# Patient Record
Sex: FEMALE | Race: WHITE | NOT HISPANIC OR LATINO | Employment: UNEMPLOYED | URBAN - METROPOLITAN AREA
[De-identification: names, ages, dates, MRNs, and addresses within clinical notes are randomized per-mention and may not be internally consistent; named-entity substitution may affect disease eponyms.]

---

## 2018-05-29 ENCOUNTER — TRANSCRIBE ORDERS (OUTPATIENT)
Dept: LAB | Facility: HOSPITAL | Age: 33
End: 2018-05-29

## 2018-05-29 ENCOUNTER — LAB (OUTPATIENT)
Dept: LAB | Facility: HOSPITAL | Age: 33
End: 2018-05-29

## 2018-05-29 DIAGNOSIS — R30.0 DYSURIA: ICD-10-CM

## 2018-05-29 DIAGNOSIS — R30.0 DYSURIA: Primary | ICD-10-CM

## 2018-05-29 LAB
BACTERIA UR QL AUTO: ABNORMAL /HPF
BILIRUB UR QL STRIP: NEGATIVE
CLARITY UR: ABNORMAL
COLOR UR: ABNORMAL
GLUCOSE UR STRIP-MCNC: NEGATIVE MG/DL
HGB UR QL STRIP.AUTO: NEGATIVE
HYALINE CASTS UR QL AUTO: ABNORMAL /LPF
KETONES UR QL STRIP: ABNORMAL
LEUKOCYTE ESTERASE UR QL STRIP.AUTO: ABNORMAL
NITRITE UR QL STRIP: NEGATIVE
PH UR STRIP.AUTO: 6 [PH] (ref 5–8)
PROT UR QL STRIP: ABNORMAL
RBC # UR: ABNORMAL /HPF
REF LAB TEST METHOD: ABNORMAL
SP GR UR STRIP: 1.03 (ref 1–1.03)
SQUAMOUS #/AREA URNS HPF: ABNORMAL /HPF
UROBILINOGEN UR QL STRIP: ABNORMAL
WBC UR QL AUTO: ABNORMAL /HPF

## 2018-05-29 PROCEDURE — 87186 SC STD MICRODIL/AGAR DIL: CPT

## 2018-05-29 PROCEDURE — 81001 URINALYSIS AUTO W/SCOPE: CPT

## 2018-05-29 PROCEDURE — 87086 URINE CULTURE/COLONY COUNT: CPT

## 2018-05-29 PROCEDURE — 87077 CULTURE AEROBIC IDENTIFY: CPT

## 2018-05-31 LAB — BACTERIA SPEC AEROBE CULT: ABNORMAL

## 2018-06-07 ENCOUNTER — TRANSCRIBE ORDERS (OUTPATIENT)
Dept: LAB | Facility: HOSPITAL | Age: 33
End: 2018-06-07

## 2018-06-07 ENCOUNTER — LAB (OUTPATIENT)
Dept: LAB | Facility: HOSPITAL | Age: 33
End: 2018-06-07

## 2018-06-07 DIAGNOSIS — Z34.81 PRENATAL CARE, SUBSEQUENT PREGNANCY, FIRST TRIMESTER: ICD-10-CM

## 2018-06-07 DIAGNOSIS — Z3A.08 8 WEEKS GESTATION OF PREGNANCY: ICD-10-CM

## 2018-06-07 DIAGNOSIS — Z3A.08 8 WEEKS GESTATION OF PREGNANCY: Primary | ICD-10-CM

## 2018-06-07 LAB
ABO GROUP BLD: NORMAL
AMPHET+METHAMPHET UR QL: NEGATIVE
AMPHETAMINES UR QL: NEGATIVE
BACTERIA UR QL AUTO: ABNORMAL /HPF
BARBITURATES UR QL SCN: NEGATIVE
BASOPHILS # BLD AUTO: 0.02 10*3/MM3 (ref 0–0.2)
BASOPHILS NFR BLD AUTO: 0.2 % (ref 0–1)
BENZODIAZ UR QL SCN: NEGATIVE
BILIRUB UR QL STRIP: NEGATIVE
BLD GP AB SCN SERPL QL: NEGATIVE
BUPRENORPHINE SERPL-MCNC: NEGATIVE NG/ML
CANNABINOIDS SERPL QL: NEGATIVE
CLARITY UR: ABNORMAL
COCAINE UR QL: NEGATIVE
COLOR UR: YELLOW
DEPRECATED RDW RBC AUTO: 42.1 FL (ref 37–54)
EOSINOPHIL # BLD AUTO: 0.09 10*3/MM3 (ref 0–0.3)
EOSINOPHIL NFR BLD AUTO: 1.1 % (ref 0–3)
ERYTHROCYTE [DISTWIDTH] IN BLOOD BY AUTOMATED COUNT: 12.4 % (ref 11.3–14.5)
GLUCOSE BLD-MCNC: 90 MG/DL (ref 70–100)
GLUCOSE UR STRIP-MCNC: NEGATIVE MG/DL
HBV SURFACE AG SERPL QL IA: NORMAL
HCT VFR BLD AUTO: 35.9 % (ref 34.5–44)
HCV AB SER DONR QL: NORMAL
HGB BLD-MCNC: 11.9 G/DL (ref 11.5–15.5)
HGB UR QL STRIP.AUTO: NEGATIVE
HIV1+2 AB SER QL: NORMAL
HYALINE CASTS UR QL AUTO: ABNORMAL /LPF
IMM GRANULOCYTES # BLD: 0.02 10*3/MM3 (ref 0–0.03)
IMM GRANULOCYTES NFR BLD: 0.2 % (ref 0–0.6)
KETONES UR QL STRIP: NEGATIVE
LEUKOCYTE ESTERASE UR QL STRIP.AUTO: ABNORMAL
LYMPHOCYTES # BLD AUTO: 2.37 10*3/MM3 (ref 0.6–4.8)
LYMPHOCYTES NFR BLD AUTO: 28.3 % (ref 24–44)
MCH RBC QN AUTO: 30.7 PG (ref 27–31)
MCHC RBC AUTO-ENTMCNC: 33.1 G/DL (ref 32–36)
MCV RBC AUTO: 92.8 FL (ref 80–99)
METHADONE UR QL SCN: NEGATIVE
MONOCYTES # BLD AUTO: 0.78 10*3/MM3 (ref 0–1)
MONOCYTES NFR BLD AUTO: 9.3 % (ref 0–12)
NEUTROPHILS # BLD AUTO: 5.12 10*3/MM3 (ref 1.5–8.3)
NEUTROPHILS NFR BLD AUTO: 61.1 % (ref 41–71)
NITRITE UR QL STRIP: NEGATIVE
OPIATES UR QL: NEGATIVE
OXYCODONE UR QL SCN: NEGATIVE
PCP UR QL SCN: NEGATIVE
PH UR STRIP.AUTO: 7.5 [PH] (ref 5–8)
PLATELET # BLD AUTO: 268 10*3/MM3 (ref 150–450)
PMV BLD AUTO: 10.1 FL (ref 6–12)
PROPOXYPH UR QL: NEGATIVE
PROT UR QL STRIP: NEGATIVE
RBC # BLD AUTO: 3.87 10*6/MM3 (ref 3.89–5.14)
RBC # UR: ABNORMAL /HPF
REF LAB TEST METHOD: ABNORMAL
RH BLD: POSITIVE
RUBV IGG SER QL: NORMAL
RUBV IGG SER-ACNC: 16 IU/ML
SP GR UR STRIP: 1.01 (ref 1–1.03)
SQUAMOUS #/AREA URNS HPF: ABNORMAL /HPF
T4 FREE SERPL-MCNC: 1.22 NG/DL (ref 0.89–1.76)
TRICYCLICS UR QL SCN: NEGATIVE
TSH SERPL DL<=0.05 MIU/L-ACNC: 0.11 MIU/ML (ref 0.35–5.35)
UROBILINOGEN UR QL STRIP: ABNORMAL
WBC NRBC COR # BLD: 8.38 10*3/MM3 (ref 3.5–10.8)
WBC UR QL AUTO: ABNORMAL /HPF

## 2018-06-07 PROCEDURE — 84443 ASSAY THYROID STIM HORMONE: CPT

## 2018-06-07 PROCEDURE — 87186 SC STD MICRODIL/AGAR DIL: CPT

## 2018-06-07 PROCEDURE — 36415 COLL VENOUS BLD VENIPUNCTURE: CPT

## 2018-06-07 PROCEDURE — 81001 URINALYSIS AUTO W/SCOPE: CPT

## 2018-06-07 PROCEDURE — 86803 HEPATITIS C AB TEST: CPT

## 2018-06-07 PROCEDURE — 87077 CULTURE AEROBIC IDENTIFY: CPT

## 2018-06-07 PROCEDURE — 84439 ASSAY OF FREE THYROXINE: CPT

## 2018-06-07 PROCEDURE — 87086 URINE CULTURE/COLONY COUNT: CPT

## 2018-06-07 PROCEDURE — 82947 ASSAY GLUCOSE BLOOD QUANT: CPT

## 2018-06-07 PROCEDURE — 80306 DRUG TEST PRSMV INSTRMNT: CPT

## 2018-06-07 PROCEDURE — 80081 OBSTETRIC PANEL INC HIV TSTG: CPT

## 2018-06-08 LAB — RPR SER QL: NORMAL

## 2018-06-09 LAB — BACTERIA SPEC AEROBE CULT: ABNORMAL

## 2018-09-06 ENCOUNTER — TRANSCRIBE ORDERS (OUTPATIENT)
Dept: LAB | Facility: HOSPITAL | Age: 33
End: 2018-09-06

## 2018-09-06 ENCOUNTER — LAB (OUTPATIENT)
Dept: LAB | Facility: HOSPITAL | Age: 33
End: 2018-09-06

## 2018-09-06 DIAGNOSIS — Z34.82 PRENATAL CARE, SUBSEQUENT PREGNANCY, SECOND TRIMESTER: ICD-10-CM

## 2018-09-06 DIAGNOSIS — Z34.82 PRENATAL CARE, SUBSEQUENT PREGNANCY, SECOND TRIMESTER: Primary | ICD-10-CM

## 2018-09-06 LAB — TSH SERPL DL<=0.05 MIU/L-ACNC: 0.58 MIU/ML (ref 0.35–5.35)

## 2018-09-06 PROCEDURE — 36415 COLL VENOUS BLD VENIPUNCTURE: CPT

## 2018-09-06 PROCEDURE — 84443 ASSAY THYROID STIM HORMONE: CPT

## 2018-09-07 ENCOUNTER — TRANSCRIBE ORDERS (OUTPATIENT)
Dept: WOMENS IMAGING | Facility: HOSPITAL | Age: 33
End: 2018-09-07

## 2018-09-07 DIAGNOSIS — M21.922 ACQUIRED ARM DEFORMITY, LEFT: ICD-10-CM

## 2018-09-07 DIAGNOSIS — O28.3 ABNORMAL FETAL ULTRASOUND: Primary | ICD-10-CM

## 2018-09-07 DIAGNOSIS — M21.942 HAND DEFORMITY, LEFT: ICD-10-CM

## 2018-09-17 ENCOUNTER — HOSPITAL ENCOUNTER (OUTPATIENT)
Dept: WOMENS IMAGING | Facility: HOSPITAL | Age: 33
Discharge: HOME | End: 2018-09-17
Attending: NURSE PRACTITIONER | Admitting: NURSE PRACTITIONER

## 2018-09-17 ENCOUNTER — OFFICE VISIT (OUTPATIENT)
Dept: OBSTETRICS AND GYNECOLOGY | Facility: HOSPITAL | Age: 33
End: 2018-09-17

## 2018-09-17 VITALS
DIASTOLIC BLOOD PRESSURE: 68 MMHG | HEIGHT: 63 IN | BODY MASS INDEX: 23.04 KG/M2 | SYSTOLIC BLOOD PRESSURE: 112 MMHG | WEIGHT: 130 LBS

## 2018-09-17 DIAGNOSIS — M21.942 HAND DEFORMITY, LEFT: ICD-10-CM

## 2018-09-17 DIAGNOSIS — O34.219 PREVIOUS CESAREAN DELIVERY AFFECTING PREGNANCY: ICD-10-CM

## 2018-09-17 DIAGNOSIS — M21.922 ACQUIRED ARM DEFORMITY, LEFT: ICD-10-CM

## 2018-09-17 DIAGNOSIS — Z84.89 FAMILY HISTORY OF GENETIC DISEASE: ICD-10-CM

## 2018-09-17 DIAGNOSIS — O28.3 ABNORMAL FETAL ULTRASOUND: ICD-10-CM

## 2018-09-17 DIAGNOSIS — IMO0002 FETAL ANOMALY: Primary | ICD-10-CM

## 2018-09-17 PROCEDURE — 99242 OFF/OP CONSLTJ NEW/EST SF 20: CPT | Performed by: OBSTETRICS & GYNECOLOGY

## 2018-09-17 PROCEDURE — 76811 OB US DETAILED SNGL FETUS: CPT

## 2018-09-17 PROCEDURE — 76811 OB US DETAILED SNGL FETUS: CPT | Performed by: OBSTETRICS & GYNECOLOGY

## 2018-09-17 RX ORDER — PRENATAL WITH FERROUS FUM AND FOLIC ACID 3080; 920; 120; 400; 22; 1.84; 3; 20; 10; 1; 12; 200; 27; 25; 2 [IU]/1; [IU]/1; MG/1; [IU]/1; MG/1; MG/1; MG/1; MG/1; MG/1; MG/1; UG/1; MG/1; MG/1; MG/1; MG/1
1 TABLET ORAL DAILY
COMMUNITY
End: 2019-07-07

## 2018-09-17 RX ORDER — ACYCLOVIR 200 MG/1
200 CAPSULE ORAL DAILY
COMMUNITY
End: 2019-01-12 | Stop reason: HOSPADM

## 2018-09-17 NOTE — PROGRESS NOTES
Denies problems. Reports had a genetic screening test drawn 9/6/2018 but has not received results yet. Previous child with MCAD; is seen in [facility] at [facility]. Reports has been told there is a 50% chance for each pregnancy with same FOB to be affected by MCAD. Next OB visit in one week.

## 2018-10-15 ENCOUNTER — OFFICE VISIT (OUTPATIENT)
Dept: OBSTETRICS AND GYNECOLOGY | Facility: HOSPITAL | Age: 33
End: 2018-10-15

## 2018-10-15 ENCOUNTER — HOSPITAL ENCOUNTER (OUTPATIENT)
Dept: WOMENS IMAGING | Facility: HOSPITAL | Age: 33
Discharge: HOME | End: 2018-10-15
Attending: OBSTETRICS & GYNECOLOGY | Admitting: OBSTETRICS & GYNECOLOGY

## 2018-10-15 VITALS — WEIGHT: 134.4 LBS | DIASTOLIC BLOOD PRESSURE: 59 MMHG | BODY MASS INDEX: 23.81 KG/M2 | SYSTOLIC BLOOD PRESSURE: 106 MMHG

## 2018-10-15 DIAGNOSIS — Z84.89 FAMILY HISTORY OF GENETIC DISEASE: ICD-10-CM

## 2018-10-15 DIAGNOSIS — O34.219 PREVIOUS CESAREAN DELIVERY AFFECTING PREGNANCY: ICD-10-CM

## 2018-10-15 DIAGNOSIS — Z84.89 FAMILY HISTORY OF GENETIC DISEASE: Primary | ICD-10-CM

## 2018-10-15 DIAGNOSIS — IMO0002 FETAL ANOMALY: ICD-10-CM

## 2018-10-15 PROCEDURE — 76816 OB US FOLLOW-UP PER FETUS: CPT | Performed by: OBSTETRICS & GYNECOLOGY

## 2018-10-15 PROCEDURE — 76816 OB US FOLLOW-UP PER FETUS: CPT

## 2018-10-15 NOTE — PROGRESS NOTES
Documentation of the ultasound findings, images, and interpretations with be available in the patient's Viewpoint report located in the Chart Review Imaging tab in [facility].

## 2018-10-25 ENCOUNTER — LAB REQUISITION (OUTPATIENT)
Dept: LAB | Facility: HOSPITAL | Age: 33
End: 2018-10-25

## 2018-10-25 DIAGNOSIS — Z34.83 ENCOUNTER FOR SUPERVISION OF OTHER NORMAL PREGNANCY, THIRD TRIMESTER: ICD-10-CM

## 2018-10-25 DIAGNOSIS — Z00.00 ROUTINE GENERAL MEDICAL EXAMINATION AT A HEALTH CARE FACILITY: ICD-10-CM

## 2018-10-25 DIAGNOSIS — Z3A.28 28 WEEKS GESTATION OF PREGNANCY: ICD-10-CM

## 2018-10-25 LAB
BLD GP AB SCN SERPL QL: NEGATIVE
DEPRECATED RDW RBC AUTO: 44.6 FL (ref 37–54)
ERYTHROCYTE [DISTWIDTH] IN BLOOD BY AUTOMATED COUNT: 12.8 % (ref 11.3–14.5)
GLUCOSE 1H P 100 G GLC PO SERPL-MCNC: 91 MG/DL (ref 65–140)
HCT VFR BLD AUTO: 32.1 % (ref 34.5–44)
HGB BLD-MCNC: 10.6 G/DL (ref 11.5–15.5)
MCH RBC QN AUTO: 32 PG (ref 27–31)
MCHC RBC AUTO-ENTMCNC: 33 G/DL (ref 32–36)
MCV RBC AUTO: 97 FL (ref 80–99)
PLATELET # BLD AUTO: 253 10*3/MM3 (ref 150–450)
PMV BLD AUTO: 10.2 FL (ref 6–12)
RBC # BLD AUTO: 3.31 10*6/MM3 (ref 3.89–5.14)
WBC NRBC COR # BLD: 11.22 10*3/MM3 (ref 3.5–10.8)

## 2018-10-25 PROCEDURE — 82950 GLUCOSE TEST: CPT | Performed by: NURSE PRACTITIONER

## 2018-10-25 PROCEDURE — 85027 COMPLETE CBC AUTOMATED: CPT | Performed by: NURSE PRACTITIONER

## 2018-10-25 PROCEDURE — 36415 COLL VENOUS BLD VENIPUNCTURE: CPT | Performed by: NURSE PRACTITIONER

## 2018-10-25 PROCEDURE — 86850 RBC ANTIBODY SCREEN: CPT | Performed by: NURSE PRACTITIONER

## 2018-12-10 ENCOUNTER — OFFICE VISIT (OUTPATIENT)
Dept: OBSTETRICS AND GYNECOLOGY | Facility: HOSPITAL | Age: 33
End: 2018-12-10

## 2018-12-10 ENCOUNTER — HOSPITAL ENCOUNTER (OUTPATIENT)
Dept: WOMENS IMAGING | Facility: HOSPITAL | Age: 33
Discharge: HOME | End: 2018-12-10
Admitting: OBSTETRICS & GYNECOLOGY

## 2018-12-10 VITALS — SYSTOLIC BLOOD PRESSURE: 120 MMHG | DIASTOLIC BLOOD PRESSURE: 70 MMHG | BODY MASS INDEX: 23.74 KG/M2 | WEIGHT: 134 LBS

## 2018-12-10 DIAGNOSIS — O34.219 PREVIOUS CESAREAN DELIVERY AFFECTING PREGNANCY: ICD-10-CM

## 2018-12-10 DIAGNOSIS — Z84.89 FAMILY HISTORY OF GENETIC DISEASE: Primary | ICD-10-CM

## 2018-12-10 DIAGNOSIS — Z84.89 FAMILY HISTORY OF GENETIC DISEASE: ICD-10-CM

## 2018-12-10 PROCEDURE — 76816 OB US FOLLOW-UP PER FETUS: CPT

## 2018-12-10 PROCEDURE — 76816 OB US FOLLOW-UP PER FETUS: CPT | Performed by: OBSTETRICS & GYNECOLOGY

## 2018-12-10 NOTE — PROGRESS NOTES
Documentation of the ultasound findings, images, and interpretations with be available in the patient's Viewpoint report located in the Chart Review Imaging tab in [facility].

## 2018-12-10 NOTE — PROGRESS NOTES
Pt states she has felt increased pelvic pressure the past couple of days.  Denies vaginal bleeding or spotting.  To see NAIF Parker Dec. 20.

## 2018-12-20 LAB — EXTERNAL GROUP B STREP ANTIGEN: NEGATIVE

## 2019-01-03 ENCOUNTER — PREP FOR SURGERY (OUTPATIENT)
Dept: OTHER | Facility: HOSPITAL | Age: 34
End: 2019-01-03

## 2019-01-03 DIAGNOSIS — Z34.90 TERM PREGNANCY: Primary | ICD-10-CM

## 2019-01-03 RX ORDER — MISOPROSTOL 200 UG/1
800 TABLET ORAL AS NEEDED
Status: CANCELLED | OUTPATIENT
Start: 2019-01-03

## 2019-01-03 RX ORDER — TRISODIUM CITRATE DIHYDRATE AND CITRIC ACID MONOHYDRATE 500; 334 MG/5ML; MG/5ML
30 SOLUTION ORAL ONCE
Status: CANCELLED | OUTPATIENT
Start: 2019-01-03 | End: 2019-01-03

## 2019-01-03 RX ORDER — CARBOPROST TROMETHAMINE 250 UG/ML
250 INJECTION, SOLUTION INTRAMUSCULAR AS NEEDED
Status: CANCELLED | OUTPATIENT
Start: 2019-01-03

## 2019-01-03 RX ORDER — CEFAZOLIN SODIUM 2 G/100ML
2 INJECTION, SOLUTION INTRAVENOUS ONCE
Status: CANCELLED | OUTPATIENT
Start: 2019-01-03 | End: 2019-01-03

## 2019-01-03 RX ORDER — OXYTOCIN-SODIUM CHLORIDE 0.9% IV SOLN 30 UNIT/500ML 30-0.9/5 UT/ML-%
85 SOLUTION INTRAVENOUS ONCE
Status: CANCELLED | OUTPATIENT
Start: 2019-01-03 | End: 2019-01-03

## 2019-01-03 RX ORDER — SODIUM CHLORIDE, SODIUM LACTATE, POTASSIUM CHLORIDE, CALCIUM CHLORIDE 600; 310; 30; 20 MG/100ML; MG/100ML; MG/100ML; MG/100ML
125 INJECTION, SOLUTION INTRAVENOUS CONTINUOUS
Status: CANCELLED | OUTPATIENT
Start: 2019-01-03

## 2019-01-03 RX ORDER — SODIUM CHLORIDE 0.9 % (FLUSH) 0.9 %
3 SYRINGE (ML) INJECTION EVERY 12 HOURS SCHEDULED
Status: CANCELLED | OUTPATIENT
Start: 2019-01-03

## 2019-01-03 RX ORDER — METHYLERGONOVINE MALEATE 0.2 MG/ML
200 INJECTION INTRAVENOUS ONCE AS NEEDED
Status: CANCELLED | OUTPATIENT
Start: 2019-01-03

## 2019-01-03 RX ORDER — HYDROCODONE BITARTRATE AND ACETAMINOPHEN 5; 325 MG/1; MG/1
1 TABLET ORAL EVERY 4 HOURS PRN
Status: CANCELLED | OUTPATIENT
Start: 2019-01-03 | End: 2019-01-13

## 2019-01-03 RX ORDER — SODIUM CHLORIDE 0.9 % (FLUSH) 0.9 %
3-10 SYRINGE (ML) INJECTION AS NEEDED
Status: CANCELLED | OUTPATIENT
Start: 2019-01-03

## 2019-01-03 RX ORDER — OXYTOCIN-SODIUM CHLORIDE 0.9% IV SOLN 30 UNIT/500ML 30-0.9/5 UT/ML-%
650 SOLUTION INTRAVENOUS ONCE
Status: CANCELLED | OUTPATIENT
Start: 2019-01-03 | End: 2019-01-03

## 2019-01-03 RX ORDER — LIDOCAINE HYDROCHLORIDE 10 MG/ML
5 INJECTION, SOLUTION EPIDURAL; INFILTRATION; INTRACAUDAL; PERINEURAL AS NEEDED
Status: CANCELLED | OUTPATIENT
Start: 2019-01-03

## 2019-01-03 RX ORDER — IBUPROFEN 600 MG/1
600 TABLET ORAL EVERY 6 HOURS PRN
Status: CANCELLED | OUTPATIENT
Start: 2019-01-03

## 2019-01-09 ENCOUNTER — APPOINTMENT (OUTPATIENT)
Dept: PREADMISSION TESTING | Facility: HOSPITAL | Age: 34
End: 2019-01-09

## 2019-01-09 VITALS — WEIGHT: 137.35 LBS | HEIGHT: 64 IN | BODY MASS INDEX: 23.45 KG/M2

## 2019-01-09 DIAGNOSIS — Z34.90 TERM PREGNANCY: ICD-10-CM

## 2019-01-09 LAB
ABO GROUP BLD: NORMAL
BLD GP AB SCN SERPL QL: NEGATIVE
DEPRECATED RDW RBC AUTO: 47.4 FL (ref 37–54)
ERYTHROCYTE [DISTWIDTH] IN BLOOD BY AUTOMATED COUNT: 13.5 % (ref 11.3–14.5)
HCT VFR BLD AUTO: 34.7 % (ref 34.5–44)
HGB BLD-MCNC: 11.6 G/DL (ref 11.5–15.5)
MCH RBC QN AUTO: 32.4 PG (ref 27–31)
MCHC RBC AUTO-ENTMCNC: 33.4 G/DL (ref 32–36)
MCV RBC AUTO: 96.9 FL (ref 80–99)
PLATELET # BLD AUTO: 256 10*3/MM3 (ref 150–450)
PMV BLD AUTO: 10.4 FL (ref 6–12)
RBC # BLD AUTO: 3.58 10*6/MM3 (ref 3.89–5.14)
RH BLD: POSITIVE
T&S EXPIRATION DATE: NORMAL
WBC NRBC COR # BLD: 11.29 10*3/MM3 (ref 3.5–10.8)

## 2019-01-09 PROCEDURE — 86900 BLOOD TYPING SEROLOGIC ABO: CPT | Performed by: OBSTETRICS & GYNECOLOGY

## 2019-01-09 PROCEDURE — 36415 COLL VENOUS BLD VENIPUNCTURE: CPT

## 2019-01-09 PROCEDURE — 86901 BLOOD TYPING SEROLOGIC RH(D): CPT | Performed by: OBSTETRICS & GYNECOLOGY

## 2019-01-09 PROCEDURE — 86850 RBC ANTIBODY SCREEN: CPT | Performed by: OBSTETRICS & GYNECOLOGY

## 2019-01-09 PROCEDURE — 85027 COMPLETE CBC AUTOMATED: CPT | Performed by: OBSTETRICS & GYNECOLOGY

## 2019-01-09 NOTE — DISCHARGE INSTRUCTIONS
What to know before your arrive:     -Do not eat, drink or chew gum after midnight the day before your procedure.    This also includes mints.   -Do not shave any part of your body including abdomen or pelvic are for two    days before your procedure.   -If you are taking a scheduled medication (insulin, blood pressure medicine,   antibiotics) please consult with your physician whether to take on the day of   surgery.   -Remove all jewelry including rings, wedding bands, and piercing before coming   to the hospital.   -Leave important valuables at home.   -Do not wear dark fingernail polish.   -Bring the following with you to the hospital:    -Picture ID and insurance, Medicare or Medicaid cards    -Co-pay/deductible required by insurance (Cash, Check, Credit Card)    -Copy of living will or power  document (if applicable)    -CPAP mask and tubing, not machine (if applicable)    -Skin prep instructions sheet    What to know the day of procedure:     -Park in the [facility], take elevator for first floor, exit to the right and  proceed through the doors to outside, follow the covered sidewalk to the  entrance of the [place], follow the hallway and signs to the [facility],  enter the [place] to your right BEFORE entering the 1720 lobby.  Take the  elevators to the 3rd floor ([address] [place]).   -Leave unnecessary items in your vehicle, including your suitcase.  Your support  person or a family member can get it for you after your procedure.   -Check in at the reception desk in the lobby of the 3rd floor ([address] [place]).   -One person may accompany you to the pre-op/recovery area.  Please have  other family members wait in the waiting room.   -An anesthesiologist will meet with your prior to your procedure.   -After anesthesia has been initiated, one person may accompany you in the  operating room.   -No video cameras are permitted in the operating room; only still cameras,  Please.      What to  expect while you are in recovery:     -One person may stay with you while you are in recovery.   -If the baby is stable, he/she may visit to initiate breastfeeding & [facility].    THE FOLLOWING INFORMATION WAS PROVIDED TO PATIENT:     SECTION BOOKLET BY [facility]IK  PAIN MANAGEMENT   RESPIREX    CHLORHEXIDINE GLUCONATE WIPES AND INSTRUCTIONS GIVEN TO PATIENT

## 2019-01-09 NOTE — PAT
Blood bank bracelet applied to patient during Pre Admission Testing visit.  Patient instructed not to remove from arm until after procedure and they are discharged from the hospital.  Explained to patient that they may shower and get the bracelet wet, but not to immerse under water for longer periods (bathing, swimming, hand dishwashing, etc).  Patient verbalized understanding.    Pt is 5 years in recovery. At discharge after previous  only took Tylenol and ibuprofen. She indicated she prefers to avoid narcotics.

## 2019-01-10 ENCOUNTER — ANESTHESIA EVENT (OUTPATIENT)
Dept: LABOR AND DELIVERY | Facility: HOSPITAL | Age: 34
End: 2019-01-10

## 2019-01-10 ENCOUNTER — ANESTHESIA (OUTPATIENT)
Dept: LABOR AND DELIVERY | Facility: HOSPITAL | Age: 34
End: 2019-01-10

## 2019-01-10 ENCOUNTER — HOSPITAL ENCOUNTER (INPATIENT)
Facility: HOSPITAL | Age: 34
LOS: 2 days | Discharge: HOME | End: 2019-01-12
Attending: OBSTETRICS & GYNECOLOGY | Admitting: OBSTETRICS & GYNECOLOGY

## 2019-01-10 DIAGNOSIS — Z34.90 TERM PREGNANCY: ICD-10-CM

## 2019-01-10 PROCEDURE — 25010000002 ONDANSETRON PER 1 MG: Performed by: NURSE ANESTHETIST, CERTIFIED REGISTERED

## 2019-01-10 PROCEDURE — 88302 TISSUE EXAM BY PATHOLOGIST: CPT | Performed by: OBSTETRICS & GYNECOLOGY

## 2019-01-10 PROCEDURE — 25010000002 FENTANYL CITRATE (PF) 100 MCG/2ML SOLUTION: Performed by: NURSE ANESTHETIST, CERTIFIED REGISTERED

## 2019-01-10 PROCEDURE — 25010000002 KETOROLAC TROMETHAMINE PER 15 MG: Performed by: OBSTETRICS & GYNECOLOGY

## 2019-01-10 PROCEDURE — 25010000003 MORPHINE PER 10 MG: Performed by: NURSE ANESTHETIST, CERTIFIED REGISTERED

## 2019-01-10 PROCEDURE — 25010000002 MIDAZOLAM PER 1 MG: Performed by: NURSE ANESTHETIST, CERTIFIED REGISTERED

## 2019-01-10 PROCEDURE — 0UB70ZZ EXCISION OF BILATERAL FALLOPIAN TUBES, OPEN APPROACH: ICD-10-PCS | Performed by: OBSTETRICS & GYNECOLOGY

## 2019-01-10 PROCEDURE — 25010000002 KETOROLAC TROMETHAMINE PER 15 MG: Performed by: NURSE ANESTHETIST, CERTIFIED REGISTERED

## 2019-01-10 PROCEDURE — 59025 FETAL NON-STRESS TEST: CPT

## 2019-01-10 PROCEDURE — 25010000003 CEFAZOLIN IN DEXTROSE 2-4 GM/100ML-% SOLUTION: Performed by: OBSTETRICS & GYNECOLOGY

## 2019-01-10 RX ORDER — OXYTOCIN-SODIUM CHLORIDE 0.9% IV SOLN 30 UNIT/500ML 30-0.9/5 UT/ML-%
85 SOLUTION INTRAVENOUS ONCE
Status: DISCONTINUED | OUTPATIENT
Start: 2019-01-10 | End: 2019-01-10 | Stop reason: HOSPADM

## 2019-01-10 RX ORDER — MISOPROSTOL 200 UG/1
800 TABLET ORAL AS NEEDED
Status: DISCONTINUED | OUTPATIENT
Start: 2019-01-10 | End: 2019-01-10 | Stop reason: HOSPADM

## 2019-01-10 RX ORDER — IBUPROFEN 600 MG/1
600 TABLET ORAL EVERY 6 HOURS PRN
Status: DISCONTINUED | OUTPATIENT
Start: 2019-01-10 | End: 2019-01-10 | Stop reason: HOSPADM

## 2019-01-10 RX ORDER — ONDANSETRON 2 MG/ML
INJECTION INTRAMUSCULAR; INTRAVENOUS AS NEEDED
Status: DISCONTINUED | OUTPATIENT
Start: 2019-01-10 | End: 2019-01-10 | Stop reason: SURG

## 2019-01-10 RX ORDER — IBUPROFEN 600 MG/1
600 TABLET ORAL EVERY 6 HOURS PRN
Status: DISCONTINUED | OUTPATIENT
Start: 2019-01-10 | End: 2019-01-12 | Stop reason: HOSPADM

## 2019-01-10 RX ORDER — ONDANSETRON 2 MG/ML
4 INJECTION INTRAMUSCULAR; INTRAVENOUS EVERY 6 HOURS PRN
Status: DISCONTINUED | OUTPATIENT
Start: 2019-01-10 | End: 2019-01-12 | Stop reason: HOSPADM

## 2019-01-10 RX ORDER — HYDROCODONE BITARTRATE AND ACETAMINOPHEN 5; 325 MG/1; MG/1
1 TABLET ORAL EVERY 4 HOURS PRN
Status: DISCONTINUED | OUTPATIENT
Start: 2019-01-10 | End: 2019-01-12 | Stop reason: HOSPADM

## 2019-01-10 RX ORDER — SODIUM CHLORIDE 0.9 % (FLUSH) 0.9 %
3 SYRINGE (ML) INJECTION EVERY 12 HOURS SCHEDULED
Status: DISCONTINUED | OUTPATIENT
Start: 2019-01-10 | End: 2019-01-10 | Stop reason: HOSPADM

## 2019-01-10 RX ORDER — CALCIUM CARBONATE 200(500)MG
2 TABLET,CHEWABLE ORAL 3 TIMES DAILY PRN
Status: DISCONTINUED | OUTPATIENT
Start: 2019-01-10 | End: 2019-01-12 | Stop reason: HOSPADM

## 2019-01-10 RX ORDER — OXYTOCIN-SODIUM CHLORIDE 0.9% IV SOLN 30 UNIT/500ML 30-0.9/5 UT/ML-%
650 SOLUTION INTRAVENOUS ONCE
Status: DISCONTINUED | OUTPATIENT
Start: 2019-01-10 | End: 2019-01-10 | Stop reason: HOSPADM

## 2019-01-10 RX ORDER — METOCLOPRAMIDE HYDROCHLORIDE 5 MG/ML
10 INJECTION INTRAMUSCULAR; INTRAVENOUS ONCE AS NEEDED
Status: DISCONTINUED | OUTPATIENT
Start: 2019-01-10 | End: 2019-01-10 | Stop reason: HOSPADM

## 2019-01-10 RX ORDER — NALOXONE HCL 0.4 MG/ML
0.4 VIAL (ML) INJECTION
Status: ACTIVE | OUTPATIENT
Start: 2019-01-10 | End: 2019-01-11

## 2019-01-10 RX ORDER — ALUMINA, MAGNESIA, AND SIMETHICONE 2400; 2400; 240 MG/30ML; MG/30ML; MG/30ML
15 SUSPENSION ORAL EVERY 4 HOURS PRN
Status: DISCONTINUED | OUTPATIENT
Start: 2019-01-10 | End: 2019-01-12 | Stop reason: HOSPADM

## 2019-01-10 RX ORDER — SODIUM CHLORIDE, SODIUM LACTATE, POTASSIUM CHLORIDE, CALCIUM CHLORIDE 600; 310; 30; 20 MG/100ML; MG/100ML; MG/100ML; MG/100ML
125 INJECTION, SOLUTION INTRAVENOUS CONTINUOUS
Status: DISCONTINUED | OUTPATIENT
Start: 2019-01-10 | End: 2019-01-12 | Stop reason: HOSPADM

## 2019-01-10 RX ORDER — CEFAZOLIN SODIUM 2 G/100ML
2 INJECTION, SOLUTION INTRAVENOUS ONCE
Status: COMPLETED | OUTPATIENT
Start: 2019-01-10 | End: 2019-01-10

## 2019-01-10 RX ORDER — HYDROCODONE BITARTRATE AND ACETAMINOPHEN 10; 325 MG/1; MG/1
1 TABLET ORAL EVERY 4 HOURS PRN
Status: DISCONTINUED | OUTPATIENT
Start: 2019-01-10 | End: 2019-01-12 | Stop reason: HOSPADM

## 2019-01-10 RX ORDER — SODIUM CHLORIDE, SODIUM LACTATE, POTASSIUM CHLORIDE, CALCIUM CHLORIDE 600; 310; 30; 20 MG/100ML; MG/100ML; MG/100ML; MG/100ML
125 INJECTION, SOLUTION INTRAVENOUS CONTINUOUS
Status: DISCONTINUED | OUTPATIENT
Start: 2019-01-10 | End: 2019-01-10

## 2019-01-10 RX ORDER — FENTANYL CITRATE 50 UG/ML
INJECTION, SOLUTION INTRAMUSCULAR; INTRAVENOUS AS NEEDED
Status: DISCONTINUED | OUTPATIENT
Start: 2019-01-10 | End: 2019-01-10 | Stop reason: SURG

## 2019-01-10 RX ORDER — HYDROCODONE BITARTRATE AND ACETAMINOPHEN 5; 325 MG/1; MG/1
1 TABLET ORAL EVERY 4 HOURS PRN
Status: DISCONTINUED | OUTPATIENT
Start: 2019-01-10 | End: 2019-01-10 | Stop reason: HOSPADM

## 2019-01-10 RX ORDER — SIMETHICONE 80 MG
80 TABLET,CHEWABLE ORAL 4 TIMES DAILY PRN
Status: DISCONTINUED | OUTPATIENT
Start: 2019-01-10 | End: 2019-01-12 | Stop reason: HOSPADM

## 2019-01-10 RX ORDER — ACETAMINOPHEN 325 MG/1
650 TABLET ORAL EVERY 4 HOURS PRN
Status: DISCONTINUED | OUTPATIENT
Start: 2019-01-10 | End: 2019-01-12 | Stop reason: HOSPADM

## 2019-01-10 RX ORDER — MORPHINE SULFATE 0.5 MG/ML
INJECTION, SOLUTION EPIDURAL; INTRATHECAL; INTRAVENOUS AS NEEDED
Status: DISCONTINUED | OUTPATIENT
Start: 2019-01-10 | End: 2019-01-10 | Stop reason: SURG

## 2019-01-10 RX ORDER — SODIUM CHLORIDE 0.9 % (FLUSH) 0.9 %
3-10 SYRINGE (ML) INJECTION AS NEEDED
Status: DISCONTINUED | OUTPATIENT
Start: 2019-01-10 | End: 2019-01-10 | Stop reason: HOSPADM

## 2019-01-10 RX ORDER — OXYTOCIN 10 [USP'U]/ML
INJECTION, SOLUTION INTRAMUSCULAR; INTRAVENOUS AS NEEDED
Status: DISCONTINUED | OUTPATIENT
Start: 2019-01-10 | End: 2019-01-10 | Stop reason: SURG

## 2019-01-10 RX ORDER — ONDANSETRON 2 MG/ML
4 INJECTION INTRAMUSCULAR; INTRAVENOUS ONCE
Status: DISCONTINUED | OUTPATIENT
Start: 2019-01-10 | End: 2019-01-10 | Stop reason: HOSPADM

## 2019-01-10 RX ORDER — KETOROLAC TROMETHAMINE 30 MG/ML
30 INJECTION, SOLUTION INTRAMUSCULAR; INTRAVENOUS EVERY 6 HOURS
Status: DISPENSED | OUTPATIENT
Start: 2019-01-10 | End: 2019-01-11

## 2019-01-10 RX ORDER — PRENATAL VIT/IRON FUM/FOLIC AC 27MG-0.8MG
1 TABLET ORAL DAILY
Status: DISCONTINUED | OUTPATIENT
Start: 2019-01-10 | End: 2019-01-12 | Stop reason: HOSPADM

## 2019-01-10 RX ORDER — METHYLERGONOVINE MALEATE 0.2 MG/ML
200 INJECTION INTRAVENOUS ONCE AS NEEDED
Status: DISCONTINUED | OUTPATIENT
Start: 2019-01-10 | End: 2019-01-10 | Stop reason: HOSPADM

## 2019-01-10 RX ORDER — MIDAZOLAM HYDROCHLORIDE 1 MG/ML
INJECTION INTRAMUSCULAR; INTRAVENOUS AS NEEDED
Status: DISCONTINUED | OUTPATIENT
Start: 2019-01-10 | End: 2019-01-10 | Stop reason: SURG

## 2019-01-10 RX ORDER — ONDANSETRON 4 MG/1
4 TABLET, FILM COATED ORAL EVERY 6 HOURS PRN
Status: DISCONTINUED | OUTPATIENT
Start: 2019-01-10 | End: 2019-01-12 | Stop reason: HOSPADM

## 2019-01-10 RX ORDER — OXYCODONE AND ACETAMINOPHEN 7.5; 325 MG/1; MG/1
1 TABLET ORAL ONCE AS NEEDED
Status: DISCONTINUED | OUTPATIENT
Start: 2019-01-10 | End: 2019-01-10 | Stop reason: HOSPADM

## 2019-01-10 RX ORDER — DIPHENHYDRAMINE HCL 25 MG
25 CAPSULE ORAL EVERY 4 HOURS PRN
Status: DISCONTINUED | OUTPATIENT
Start: 2019-01-10 | End: 2019-01-12 | Stop reason: HOSPADM

## 2019-01-10 RX ORDER — CARBOPROST TROMETHAMINE 250 UG/ML
250 INJECTION, SOLUTION INTRAMUSCULAR AS NEEDED
Status: DISCONTINUED | OUTPATIENT
Start: 2019-01-10 | End: 2019-01-10 | Stop reason: HOSPADM

## 2019-01-10 RX ORDER — ACETAMINOPHEN 325 MG/1
650 TABLET ORAL ONCE AS NEEDED
Status: DISCONTINUED | OUTPATIENT
Start: 2019-01-10 | End: 2019-01-10 | Stop reason: HOSPADM

## 2019-01-10 RX ORDER — DOCUSATE SODIUM 100 MG/1
100 CAPSULE, LIQUID FILLED ORAL 2 TIMES DAILY
Status: DISCONTINUED | OUTPATIENT
Start: 2019-01-10 | End: 2019-01-12 | Stop reason: HOSPADM

## 2019-01-10 RX ORDER — LIDOCAINE HYDROCHLORIDE 10 MG/ML
5 INJECTION, SOLUTION EPIDURAL; INFILTRATION; INTRACAUDAL; PERINEURAL AS NEEDED
Status: DISCONTINUED | OUTPATIENT
Start: 2019-01-10 | End: 2019-01-10 | Stop reason: HOSPADM

## 2019-01-10 RX ORDER — LANOLIN 100 %
OINTMENT (GRAM) TOPICAL
Status: DISCONTINUED | OUTPATIENT
Start: 2019-01-10 | End: 2019-01-12 | Stop reason: HOSPADM

## 2019-01-10 RX ORDER — KETOROLAC TROMETHAMINE 30 MG/ML
INJECTION, SOLUTION INTRAMUSCULAR; INTRAVENOUS AS NEEDED
Status: DISCONTINUED | OUTPATIENT
Start: 2019-01-10 | End: 2019-01-10 | Stop reason: SURG

## 2019-01-10 RX ORDER — NICOTINE 21 MG/24HR
1 PATCH, TRANSDERMAL 24 HOURS TRANSDERMAL
Status: DISCONTINUED | OUTPATIENT
Start: 2019-01-10 | End: 2019-01-12 | Stop reason: HOSPADM

## 2019-01-10 RX ORDER — BUPIVACAINE HYDROCHLORIDE 7.5 MG/ML
INJECTION, SOLUTION EPIDURAL; RETROBULBAR AS NEEDED
Status: DISCONTINUED | OUTPATIENT
Start: 2019-01-10 | End: 2019-01-10 | Stop reason: SURG

## 2019-01-10 RX ORDER — TRISODIUM CITRATE DIHYDRATE AND CITRIC ACID MONOHYDRATE 500; 334 MG/5ML; MG/5ML
30 SOLUTION ORAL ONCE
Status: COMPLETED | OUTPATIENT
Start: 2019-01-10 | End: 2019-01-10

## 2019-01-10 RX ORDER — HYDROMORPHONE HYDROCHLORIDE 1 MG/ML
0.5 INJECTION, SOLUTION INTRAMUSCULAR; INTRAVENOUS; SUBCUTANEOUS
Status: DISCONTINUED | OUTPATIENT
Start: 2019-01-10 | End: 2019-01-10 | Stop reason: HOSPADM

## 2019-01-10 RX ORDER — SIMETHICONE 80 MG
80 TABLET,CHEWABLE ORAL 4 TIMES DAILY
Status: DISCONTINUED | OUTPATIENT
Start: 2019-01-10 | End: 2019-01-12 | Stop reason: HOSPADM

## 2019-01-10 RX ORDER — IBUPROFEN 600 MG/1
600 TABLET ORAL ONCE AS NEEDED
Status: DISCONTINUED | OUTPATIENT
Start: 2019-01-10 | End: 2019-01-10

## 2019-01-10 RX ADMIN — NICOTINE 1 PATCH: 21 PATCH, EXTENDED RELEASE TRANSDERMAL at 15:40

## 2019-01-10 RX ADMIN — CEFAZOLIN SODIUM 2 G: 2 INJECTION, SOLUTION INTRAVENOUS at 11:53

## 2019-01-10 RX ADMIN — OXYTOCIN 30 UNITS: 10 INJECTION, SOLUTION INTRAMUSCULAR; INTRAVENOUS at 12:37

## 2019-01-10 RX ADMIN — KETOROLAC TROMETHAMINE 30 MG: 30 INJECTION, SOLUTION INTRAMUSCULAR at 13:23

## 2019-01-10 RX ADMIN — ACETAMINOPHEN 650 MG: 325 TABLET ORAL at 15:41

## 2019-01-10 RX ADMIN — MIDAZOLAM HYDROCHLORIDE 1 MG: 1 INJECTION, SOLUTION INTRAMUSCULAR; INTRAVENOUS at 12:17

## 2019-01-10 RX ADMIN — SODIUM CHLORIDE, POTASSIUM CHLORIDE, SODIUM LACTATE AND CALCIUM CHLORIDE 125 ML/HR: 600; 310; 30; 20 INJECTION, SOLUTION INTRAVENOUS at 12:14

## 2019-01-10 RX ADMIN — SODIUM CHLORIDE, POTASSIUM CHLORIDE, SODIUM LACTATE AND CALCIUM CHLORIDE 125 ML/HR: 600; 310; 30; 20 INJECTION, SOLUTION INTRAVENOUS at 17:07

## 2019-01-10 RX ADMIN — DOCUSATE SODIUM 100 MG: 100 CAPSULE, LIQUID FILLED ORAL at 19:53

## 2019-01-10 RX ADMIN — SODIUM CITRATE AND CITRIC ACID MONOHYDRATE 30 ML: 500; 334 SOLUTION ORAL at 12:14

## 2019-01-10 RX ADMIN — SIMETHICONE 80 MG: 80 TABLET, CHEWABLE ORAL at 19:53

## 2019-01-10 RX ADMIN — KETOROLAC TROMETHAMINE 30 MG: 30 INJECTION, SOLUTION INTRAMUSCULAR at 19:53

## 2019-01-10 RX ADMIN — ACETAMINOPHEN 650 MG: 325 TABLET ORAL at 19:53

## 2019-01-10 RX ADMIN — ONDANSETRON 4 MG: 2 INJECTION INTRAMUSCULAR; INTRAVENOUS at 12:17

## 2019-01-10 RX ADMIN — SODIUM CHLORIDE, POTASSIUM CHLORIDE, SODIUM LACTATE AND CALCIUM CHLORIDE 1000 ML: 600; 310; 30; 20 INJECTION, SOLUTION INTRAVENOUS at 11:29

## 2019-01-10 RX ADMIN — SODIUM CHLORIDE, POTASSIUM CHLORIDE, SODIUM LACTATE AND CALCIUM CHLORIDE: 600; 310; 30; 20 INJECTION, SOLUTION INTRAVENOUS at 12:57

## 2019-01-10 RX ADMIN — BUPIVACAINE HYDROCHLORIDE 1.3 ML: 7.5 INJECTION, SOLUTION EPIDURAL; RETROBULBAR at 12:22

## 2019-01-10 RX ADMIN — FENTANYL CITRATE 15 MCG: 50 INJECTION, SOLUTION INTRAMUSCULAR; INTRAVENOUS at 12:22

## 2019-01-10 RX ADMIN — SODIUM CHLORIDE, POTASSIUM CHLORIDE, SODIUM LACTATE AND CALCIUM CHLORIDE: 600; 310; 30; 20 INJECTION, SOLUTION INTRAVENOUS at 12:34

## 2019-01-10 RX ADMIN — FENTANYL CITRATE 35 MCG: 50 INJECTION, SOLUTION INTRAMUSCULAR; INTRAVENOUS at 13:02

## 2019-01-10 RX ADMIN — OXYTOCIN 20 UNITS: 10 INJECTION, SOLUTION INTRAMUSCULAR; INTRAVENOUS at 12:57

## 2019-01-10 RX ADMIN — MORPHINE SULFATE 150 MCG: 0.5 INJECTION, SOLUTION EPIDURAL; INTRATHECAL; INTRAVENOUS at 12:22

## 2019-01-10 NOTE — PLAN OF CARE
Problem: Patient Care Overview  Goal: Plan of Care Review  Outcome: Ongoing (interventions implemented as appropriate)   01/10/19 1630   Coping/Psychosocial   Plan of Care Reviewed With patient   Plan of Care Review   Progress no change   OTHER   Outcome Summary Infant latches and breastfeeds well. However, infant may have a metabollic disorder that necessetates supplementation until patient's milk volume picks up. Patient will breastfeed infant, limiting her to 10 minutes per side and will supplement with formula afterwards per MD, and pump her breasts with a hospital pump. She was shown how to use the pump.        Problem: Breastfeeding (Adult,Obstetrics,Pediatric)  Intervention: Support Exclusive Breastfeeding Success   01/10/19 1630   Reproductive Interventions   Breastfeeding Assistance feeding cue recognition promoted;feeding session observed;infant latch-on verified;electric breast pump used;support offered

## 2019-01-10 NOTE — H&P
Christian  Obstetric History and Physical    Chief Complaint   Patient presents with   • Scheduled        Subjective     Patient is a 33 y.o. female  currently at 39w0d, who presents for scheduled repeat  section with BTL.    Her prenatal care is complicated by left fetal forearm deformity.  She has been followed by MFM.   Her previous obstetric/gynecological history is noted for prior CS, desires repeat.    The following portions of the patients history were reviewed and updated as appropriate: current medications, allergies, past medical history, past surgical history, past family history, past social history and problem list .       Prenatal Information:   Maternal Prenatal Labs  Blood Type ABO Type   Date Value Ref Range Status   2019 A  Final      Rh Status RH type   Date Value Ref Range Status   2019 Positive  Final      Antibody Screen Antibody Screen   Date Value Ref Range Status   2019 Negative  Final      Gonnorhea No results found for: GCCX   Chlamydia No results found for: CLAMYDCU   RPR No results found for: RPR   Syphilis Antibody No results found for: SYPHILIS   Rubella No results found for: RUBELLAIGGIN   Hepatitis B Surface Antigen No results found for: HEPBSAG   HIV-1 Antibody No results found for: LABHIV1   Hepatitis C Antibody No results found for: HEPCAB   Rapid Urin Drug Screen No results found for: AMPMETHU, BARBITSCNUR, LABBENZSCN, LABMETHSCN, LABOPIASCN, THCURSCR, COCAINEUR, AMPHETSCREEN, PROPOXSCN, BUPRENORSCNU, METAMPSCNUR, OXYCODONESCN, TRICYCLICSCN   Group B Strep Culture No results found for: GBSANTIGEN                 Past OB History:       Obstetric History       T1      L1     SAB0   TAB0   Ectopic0   Molar0   Multiple0   Live Births1       # Outcome Date GA Lbr Wilder/2nd Weight Sex Delivery Anes PTL Lv   2 Current            1 Term 02/05/15 40w5d  3572 g (7 lb 14 oz) M CS-Unspec  N KAMRYN      Complications: Status post induction  of labor,Breech presentation          Past Medical History: Past Medical History:   Diagnosis Date   • Abnormal Pap smear of cervix     HPV   • Anxiety and depression     saw a therapist; no medication   • HPV in female    • HPV in female    • HSV-2 infection 2018   • Seizures (CMS/[facility])     Last seizure at 6 years old. Took meds as child.    • Substance abuse (CMS/HCC)     5 years in [facility]    • UTI (urinary tract infection)    • Varicella     as a child       Past Surgical History Past Surgical History:   Procedure Laterality Date   • CERVICAL BIOPSY      HPV   • CERVICAL BIOPSY     •  SECTION PRIMARY  2015   • WISDOM TOOTH EXTRACTION        Family History: No family history on file.   Social History:  reports that she has been smoking cigarettes.  She has a 15.00 pack-year smoking history. she has never used smokeless tobacco.   reports that she does not drink alcohol.   reports that she does not use drugs.        REVIEW OF SYSTEMS              Reports fetal movement is normal             Denies leakage of amniotic fluid.             Denies vaginal bleeding             She reports No contractions             All other systems reviewed and are negative    Objective       Vital Signs Range for the last 24 hours  Temperature: Temp:  [98.3 °F (36.8 °C)] 98.3 °F (36.8 °C)   Temp Source: Temp src: Oral   BP: BP: (121)/(85) 121/85   Pulse: Heart Rate:  [83] 83   Respirations: Resp:  [18] 18   SPO2:     O2 Amount (l/min):     O2 Devices     Weight: Weight:  [62.3 kg (137 lb 5.6 oz)] 62.3 kg (137 lb 5.6 oz)           Fetal Heart Rate Assessment   Method:     Beats/min:     Baseline:  130   Varibility:  moderate   Accels:  15x15   Decels:  absent   Tracing Category:  1    NST: REACTIVE     Uterine Assessment : quiet  Method:  external   Frequency (min):     Constitutional:  Well developed, well nourished, no acute distress, well-groomed.   Respiratory:  Lungs are clear to auscultation  bilaterally, normal breath sounds.   Cardiovascular:  Normal rate and rhythm, no murmurs.   Gastrointestinal:  Soft, gravid, nontender.  Uterus: Soft, nontender. Fundus appropriate for dates.  Neurologic:  Alert & oriented x 3,  no focal deficits noted.   Psychiatric:  Speech and behavior appropriate.   Extremities: no cyanosis, clubbing or edema, no evidence of DVT.        Labs:  Lab Results   Component Value Date    WBC 11.29 (H) 2019    HGB 11.6 2019    HCT 34.7 2019    MCV 96.9 2019     2019    URICACID 5.6 2015    AST 23 2015    ALT 11 2015     2015     Results from last 7 days   Lab Units  19   1207   ABO TYPING   A   RH TYPING   Positive   ANTIBODY SCREEN   Negative           Assessment/Plan       Term pregnancy        Assessment:  1.  Intrauterine pregnancy at 39w0d weeks gestation with reactive fetal status.    2.   scheduled  section with BTL.  They are 100% certain they are done childbearing.    3.  Obstetrical history significant for prior CS.  This fetus has a left arm deformity.  4.  GBS status: negative  5.  Smoker  6.  H/o HSV    Plan:  1. with BTL.  Ancef 2g preop.  2. Plan of care has been reviewed with patient and questions answered.  3.  Risks, benefits of treatment plan have been discussed.  4.  All questions have been answered.        Nakia Bartlett MD  1/10/2019  12:15 PM

## 2019-01-10 NOTE — ANESTHESIA PROCEDURE NOTES
Spinal Block      Patient reassessed immediately prior to procedure    Patient location during procedure: OR  Indication:at surgeon's request  Performed By  Anesthesiologist: Maulik Reece DO  CRNA: Prema Perez CRNA  Preanesthetic Checklist  Completed: patient identified, surgical consent, pre-op evaluation, timeout performed, IV checked, risks and benefits discussed and monitors and equipment checked  Spinal Block Prep:  Patient Position:sitting  Sterile Tech:cap, gloves, mask and sterile barriers  Prep:Betadine  Patient Monitoring:blood pressure monitoring, continuous pulse oximetry and EKG  Spinal Block Procedure  Approach:midline  Guidance:palpation technique  Location:L3-L4  Needle Type:Nicole  Needle Gauge:25 G  Placement of Spinal needle event:cerebrospinal fluid aspirated  Paresthesia: no  Fluid Appearance:clear     Post Assessment  Patient Tolerance:patient tolerated the procedure well with no apparent complications  Complications no

## 2019-01-10 NOTE — PLAN OF CARE
Problem: Patient Care Overview  Goal: Plan of Care Review  Outcome: Ongoing (interventions implemented as appropriate)   01/10/19 1415   Coping/Psychosocial   Plan of Care Reviewed With patient     Goal: Discharge Needs Assessment  Outcome: Ongoing (interventions implemented as appropriate)   01/10/19 1415   Discharge Needs Assessment   Concerns to be Addressed denies needs/concerns at this time     Goal: Interprofessional Rounds/Family Conf  Outcome: Ongoing (interventions implemented as appropriate)   01/10/19 1415   Interdisciplinary Rounds/Family Conf   Participants nursing       Problem:  Delivery (Adult,Obstetrics,Pediatric)  Goal: Signs and Symptoms of Listed Potential Problems Will be Absent, Minimized or Managed ( Delivery)  Outcome: Outcome(s) achieved Date Met: 01/10/19   01/10/19 1415   Goal/Outcome Evaluation   Problems Assessed ( Delivery) all   Problems Present ( Delivery) none

## 2019-01-10 NOTE — ANESTHESIA POSTPROCEDURE EVALUATION
Patient: Eleanor Bush    Procedure Summary     Date:  01/10/19 Room / Location:   [facility] DELIV[facility]   [facility]    Anesthesia Start:   Anesthesia Stop:      Procedure:   SECTION REPEAT WITH TUBAL (Bilateral Abdomen) Diagnosis:      Surgeon:  Nakia Bartlett MD Provider:  Maulik Reece DO    Anesthesia Type:  spinal, ITN ASA Status:  2          Anesthesia Type: spinal, ITN  Last vitals  BP   101/74 (01/10/19 1315)   Temp   97.6 °F (36.4 °C) (01/10/19 1315)   Pulse   76 (01/10/19 1315)   Resp   18 (01/10/19 1315)     SpO2   100 % (01/10/19 1315)     Post Anesthesia Care and Evaluation    Patient location during evaluation: bedside  Patient participation: complete - patient participated  Level of consciousness: awake and alert  Pain management: adequate  Airway patency: patent  Anesthetic complications: No anesthetic complications    Cardiovascular status: acceptable  Respiratory status: acceptable  Hydration status: acceptable

## 2019-01-10 NOTE — SIGNIFICANT NOTE
Pt. States she is a recovering drug addict and is requesting to not take narcotics. Tylenol given at a pain rating of 8 per pt. Request.

## 2019-01-10 NOTE — ANESTHESIA PREPROCEDURE EVALUATION
Anesthesia Evaluation     Patient summary reviewed and Nursing notes reviewed   NPO Solid Status: > 8 hours  NPO Liquid Status: > 8 hours           Airway   Mallampati: I  TM distance: >3 FB  Neck ROM: full  Dental      Pulmonary    (+) a smoker Current,   Cardiovascular - negative cardio ROS        Neuro/Psych  (+) seizures (childhood, last one at [age] years old) well controlled, psychiatric history Anxiety and Depression,     GI/Hepatic/Renal/Endo - negative ROS     Musculoskeletal (-) negative ROS    Abdominal    Substance History   Drug use: h/o opioid dependence.     OB/GYN    (+) Pregnant,         Other - negative ROS                     Anesthesia Plan    ASA 2     spinal and ITN     Anesthetic plan, all risks, benefits, and alternatives have been provided, discussed and informed consent has been obtained with: patient.

## 2019-01-10 NOTE — OP NOTE
Section with Bilateral Tubal Ligation Procedure Note    Eleanor Bush    1/10/2019     Indications: 1. IUP at 39w0d   2. Desires permanent sterilization      3. Prior CS      4. Arcuate uterus     5. Breech presentation    Pre-operative Diagnosis: 39w0d    Post-operative Diagnosis: same    Findings:    Birth Information  YOB: 2017   Time of birth: 12:36 PM   Delivering clinician: Nakia Bartlett   Sex: female   Delivery type: , Low Transverse   Breech type (if applicable):     Observed anomalies/comments:         Estimated Blood Loss:  700mL           Drains: Maya                 Specimens: Tubal segements               Complications:  None; patient tolerated the procedure well.           Disposition: PACU - hemodynamically stable.           Condition: stable    Surgeon: Nakia Bartlett MD     Assistants: scrub tech    Anesthesia: Spinal anesthesia    ASA Class: 2    Procedure Details   The patient was seen in the Holding Room. The risks, benefits, complications, treatment options, and expected outcomes were discussed with the patient.  The patient concurred with the proposed plan, giving informed consent.  The site of surgery properly noted/marked. The patient was taken to the Operating Room, identified as Eleanor Bush and the procedure verified as  Delivery. A Time Out was held and the above information confirmed.    After induction of anesthesia, the patient was draped and prepped in the usual sterile manner. A Pfannenstiel incision was made and carried down through the subcutaneous tissue to the fascia. The fascial incision was made and extended transversely. The fascia was  from the underlying rectus tissue superiorly and inferiorly. The peritoneum was identified and entered. The peritoneal incision was extended longitudinally.  A low transverse uterine incision was made. Delivered from the complete breech presentation using the standard breech  extraction maneuvers was a  Measurements  Weight (oz): 91.99    Length (in): 19    Head circumference (in):      Chest circumference (in):      with apgars scores of         APGARS  One minute Five minutes Ten minutes Fifteen minutes Twenty minutes   Skin color: 1   1             Heart rate: 2   2             Grimace: 2   2              Muscle tone: 2   2              Breathin   2              Totals: 8   9              . After the umbilical cord was clamped and cut cord blood was obtained for evaluation. The placenta was removed intact and appeared normal. The tubes and ovaries appeared normal. The uterus was arcuate shaped.  The uterine incision was closed with running locked sutures of 1 Vicryl. Hemostasis was observed. The right fallopian tube was then elevated with a chance clamp.  The Bovie was used to create a small window through the mesolapinx at an avascular portion.  A 3-0 plain was used to tie on either side of the chance clamp, taking care to ensure a 3cm segment of tube was tied off.   The tubal segment was excised and sent to pathology.  The same procedure was repeated on the left fallopian tube.  The 3cm segment of tube was sent to pathology.  Endosalpinx was noted on both cut surfaces of both fallopian tubes.  The uterus was replaced into the peritoneal cavity and the tubal sites as well as the hysterotomy were noted to be hemostatic.The peritoneum was then reapproximated.   The fascia was then reapproximated with running sutures of 0 Vicryl. The skin was reapproximated with 4-0 monocryl and skin glue.    Instrument, sponge, and needle counts were correct prior the abdominal closure and at the conclusion of the case.         Nakia Bartlett MD  1:31 PM  1/10/2019

## 2019-01-11 LAB
BASOPHILS # BLD AUTO: 0.03 10*3/MM3 (ref 0–0.2)
BASOPHILS NFR BLD AUTO: 0.2 % (ref 0–1)
DEPRECATED RDW RBC AUTO: 48.2 FL (ref 37–54)
EOSINOPHIL # BLD AUTO: 0.07 10*3/MM3 (ref 0–0.3)
EOSINOPHIL NFR BLD AUTO: 0.6 % (ref 0–3)
ERYTHROCYTE [DISTWIDTH] IN BLOOD BY AUTOMATED COUNT: 13.7 % (ref 11.3–14.5)
HCT VFR BLD AUTO: 25.2 % (ref 34.5–44)
HGB BLD-MCNC: 8.6 G/DL (ref 11.5–15.5)
IMM GRANULOCYTES # BLD AUTO: 0.04 10*3/MM3 (ref 0–0.03)
IMM GRANULOCYTES NFR BLD AUTO: 0.3 % (ref 0–0.6)
LYMPHOCYTES # BLD AUTO: 3.13 10*3/MM3 (ref 0.6–4.8)
LYMPHOCYTES NFR BLD AUTO: 25.3 % (ref 24–44)
MCH RBC QN AUTO: 33.2 PG (ref 27–31)
MCHC RBC AUTO-ENTMCNC: 34.1 G/DL (ref 32–36)
MCV RBC AUTO: 97.3 FL (ref 80–99)
MONOCYTES # BLD AUTO: 1.23 10*3/MM3 (ref 0–1)
MONOCYTES NFR BLD AUTO: 10 % (ref 0–12)
NEUTROPHILS # BLD AUTO: 7.9 10*3/MM3 (ref 1.5–8.3)
NEUTROPHILS NFR BLD AUTO: 63.9 % (ref 41–71)
PLATELET # BLD AUTO: 237 10*3/MM3 (ref 150–450)
PMV BLD AUTO: 10.3 FL (ref 6–12)
RBC # BLD AUTO: 2.59 10*6/MM3 (ref 3.89–5.14)
WBC NRBC COR # BLD: 12.36 10*3/MM3 (ref 3.5–10.8)

## 2019-01-11 PROCEDURE — 85025 COMPLETE CBC W/AUTO DIFF WBC: CPT | Performed by: OBSTETRICS & GYNECOLOGY

## 2019-01-11 PROCEDURE — 36415 COLL VENOUS BLD VENIPUNCTURE: CPT | Performed by: OBSTETRICS & GYNECOLOGY

## 2019-01-11 PROCEDURE — 25010000002 KETOROLAC TROMETHAMINE PER 15 MG: Performed by: OBSTETRICS & GYNECOLOGY

## 2019-01-11 RX ORDER — FERROUS SULFATE 325(65) MG
325 TABLET ORAL 2 TIMES DAILY WITH MEALS
Status: DISCONTINUED | OUTPATIENT
Start: 2019-01-11 | End: 2019-01-12 | Stop reason: HOSPADM

## 2019-01-11 RX ADMIN — SIMETHICONE 80 MG: 80 TABLET, CHEWABLE ORAL at 21:01

## 2019-01-11 RX ADMIN — ACETAMINOPHEN 650 MG: 325 TABLET ORAL at 00:38

## 2019-01-11 RX ADMIN — DOCUSATE SODIUM 100 MG: 100 CAPSULE, LIQUID FILLED ORAL at 21:01

## 2019-01-11 RX ADMIN — Medication 325 MG: at 18:46

## 2019-01-11 RX ADMIN — HYDROCODONE BITARTRATE AND ACETAMINOPHEN 1 TABLET: 5; 325 TABLET ORAL at 14:56

## 2019-01-11 RX ADMIN — DOCUSATE SODIUM 100 MG: 100 CAPSULE, LIQUID FILLED ORAL at 07:45

## 2019-01-11 RX ADMIN — KETOROLAC TROMETHAMINE 30 MG: 30 INJECTION, SOLUTION INTRAMUSCULAR at 01:50

## 2019-01-11 RX ADMIN — ACETAMINOPHEN 650 MG: 325 TABLET ORAL at 10:11

## 2019-01-11 RX ADMIN — ACETAMINOPHEN 650 MG: 325 TABLET ORAL at 05:14

## 2019-01-11 RX ADMIN — Medication 325 MG: at 10:11

## 2019-01-11 RX ADMIN — SIMETHICONE 80 MG: 80 TABLET, CHEWABLE ORAL at 14:56

## 2019-01-11 RX ADMIN — SIMETHICONE 80 MG: 80 TABLET, CHEWABLE ORAL at 07:46

## 2019-01-11 RX ADMIN — HYDROCODONE BITARTRATE AND ACETAMINOPHEN 1 TABLET: 5; 325 TABLET ORAL at 19:53

## 2019-01-11 RX ADMIN — IBUPROFEN 600 MG: 600 TABLET ORAL at 14:56

## 2019-01-11 RX ADMIN — IBUPROFEN 600 MG: 600 TABLET ORAL at 21:01

## 2019-01-11 RX ADMIN — IBUPROFEN 600 MG: 600 TABLET ORAL at 08:06

## 2019-01-11 NOTE — ANESTHESIA POSTPROCEDURE EVALUATION
Patient: Eleanor Bush    Procedure Summary     Date:  01/10/19 Room / Location:  [facility] LABOR DELIVERY   [facility]    Anesthesia Start:   Anesthesia Stop:      Procedure:   SECTION REPEAT WITH TUBAL (Bilateral Abdomen) Diagnosis:      Surgeon:  Nakia Bartlett MD Provider:  Maulik Reece DO    Anesthesia Type:  spinal, ITN ASA Status:  2          Anesthesia Type: spinal, ITN  Last vitals  BP   100/59 (19 0400)   Temp   97.8 °F (36.6 °C) (19 0400)   Pulse   73 (19 0400)   Resp   16 (19 0400)     SpO2   100 % (01/10/19 1430)     Post Anesthesia Care and Evaluation    Patient location during evaluation: bedside  Patient participation: complete - patient participated  Level of consciousness: awake and alert  Pain management: adequate  Airway patency: patent  Anesthetic complications: No anesthetic complications    Cardiovascular status: acceptable  Respiratory status: acceptable  Hydration status: acceptable  Post Neuraxial Block status: Motor and sensory function returned to baseline and No signs or symptoms of PDPH

## 2019-01-11 NOTE — LACTATION NOTE
01/11/19 1620   Maternal Information   Date of Referral 01/11/19   Person Making Referral (fu consult)   Equipment Type   Breast Pump Type double electric, hospital grade   Reproductive Interventions   Breastfeeding Assistance support offered   Breastfeeding Support diary/feeding log utilized   Breast Pumping   Breast Pumping Interventions post-feed pumping encouraged   Mom states breastfeeding is going well. Continuing to give formula after breastfeedings and pumping. Not getting anything yet with pumping but states she is pumping every 3 hours. Continue every 3 hour feedings. Reviewed teaching, as documented under Education. To call lactation services, if there are questions or concerns.

## 2019-01-11 NOTE — PROGRESS NOTES
2019    Name:Eleanor Bush    MR#:[id number]     PROGRESS NOTE:  Post-Op 1 S/P        Subjective   33 y.o. yo Female  s/p CS at 39w0d doing well. Pain well controlled, lochia appropriate    Patient Active Problem List   Diagnosis   • Fetal anomaly   • Family history of genetic disease   • Previous  delivery affecting pregnancy   • Term pregnancy        Objective    Vitals  Temp:  Temp:  [97.3 °F (36.3 °C)-98.5 °F (36.9 °C)] 98.3 °F (36.8 °C)  Temp src: Oral  BP:  BP: (100-121)/(51-85) 110/53  Pulse:  Heart Rate:  [58-88] 80  RR:   Resp:  [14-18] 18    General Awake, alert, no distress  Abdomen Soft, non-distended, fundus firm, below umbilicus, appropriately tender  Incision  Intact, no erythema or exudate  Extremities Calves NT bilaterally     I/O last 3 completed shifts:  In: 1800 [I.V.:1800]  Out: 3739 [Urine:1925; Blood:1814]    LABS:   Lab Results   Component Value Date    WBC 12.36 (H) 2019    HGB 8.6 (L) 2019    HCT 25.2 (L) 2019    MCV 97.3 2019     2019       Infant: female       Assessment   1.  POD 1 from  Section  2.   acute blood loss anemia from delivery: asymptomatic     Plan: Doing well.    Po iron  D/C iv, advance diet, may shower.          Nakia Bartlett MD  2019 8:33 AM

## 2019-01-12 VITALS
HEART RATE: 72 BPM | OXYGEN SATURATION: 100 % | DIASTOLIC BLOOD PRESSURE: 69 MMHG | TEMPERATURE: 98.2 F | SYSTOLIC BLOOD PRESSURE: 106 MMHG | RESPIRATION RATE: 16 BRPM

## 2019-01-12 PROBLEM — Z34.90 TERM PREGNANCY: Status: RESOLVED | Noted: 2019-01-10 | Resolved: 2019-01-12

## 2019-01-12 RX ORDER — FERROUS SULFATE 325(65) MG
325 TABLET ORAL
Qty: 90 TABLET | Refills: 0 | Status: SHIPPED | OUTPATIENT
Start: 2019-01-12 | End: 2019-03-03

## 2019-01-12 RX ORDER — PSEUDOEPHEDRINE HCL 30 MG
100 TABLET ORAL 2 TIMES DAILY PRN
Start: 2019-01-12 | End: 2019-03-03

## 2019-01-12 RX ORDER — HYDROCODONE BITARTRATE AND ACETAMINOPHEN 5; 325 MG/1; MG/1
1-2 TABLET ORAL EVERY 4 HOURS PRN
Qty: 20 TABLET | Refills: 0 | Status: SHIPPED | OUTPATIENT
Start: 2019-01-12 | End: 2019-01-20

## 2019-01-12 RX ORDER — IBUPROFEN 600 MG/1
600 TABLET ORAL EVERY 6 HOURS PRN
Qty: 60 TABLET | Refills: 0 | OUTPATIENT
Start: 2019-01-12 | End: 2019-07-07

## 2019-01-12 RX ADMIN — IBUPROFEN 600 MG: 600 TABLET ORAL at 06:07

## 2019-01-12 RX ADMIN — HYDROCODONE BITARTRATE AND ACETAMINOPHEN 1 TABLET: 5; 325 TABLET ORAL at 10:14

## 2019-01-12 RX ADMIN — DOCUSATE SODIUM 100 MG: 100 CAPSULE, LIQUID FILLED ORAL at 10:13

## 2019-01-12 RX ADMIN — SIMETHICONE 80 MG: 80 TABLET, CHEWABLE ORAL at 12:39

## 2019-01-12 RX ADMIN — ACETAMINOPHEN 650 MG: 325 TABLET ORAL at 02:02

## 2019-01-12 RX ADMIN — SIMETHICONE 80 MG: 80 TABLET, CHEWABLE ORAL at 10:13

## 2019-01-12 RX ADMIN — IBUPROFEN 600 MG: 600 TABLET ORAL at 12:40

## 2019-01-12 RX ADMIN — Medication 325 MG: at 10:14

## 2019-01-12 RX ADMIN — PRENATAL VIT W/ FE FUMARATE-FA TAB 27-0.8 MG 1 TABLET: 27-0.8 TAB at 10:14

## 2019-01-12 NOTE — PROGRESS NOTES
2019    Name:Eleanor Bush    MR#:[id number]     PROGRESS NOTE:  Post-Op 2 S/P        Subjective   33 y.o. yo Female  s/p CS at 39w0d doing well. Pain well controlled, lochia appropriate, tolerating diet.  No weakness, dizziness, CP, SOB.  Ambulating w/o incident    Patient Active Problem List   Diagnosis   • Fetal anomaly   • Family history of genetic disease   • Previous  delivery affecting pregnancy   • Term pregnancy        Objective    Vitals  Temp:  Temp:  [97.9 °F (36.6 °C)-99 °F (37.2 °C)] 98.2 °F (36.8 °C)  Temp src: Oral  BP:  BP: ()/(56-80) 106/69  Pulse:  Heart Rate:  [61-85] 72  RR:   Resp:  [16-18] 16    General Awake, alert, no distress  Abdomen Soft, non-distended, fundus firm, below umbilicus, appropriately tender  Incision  Intact, no erythema or exudate  Extremities Calves NT bilaterally     I/O last 3 completed shifts:  In: -   Out: 4450 [Urine:4450]    LABS:   Lab Results   Component Value Date    WBC 12.36 (H) 2019    HGB 8.6 (L) 2019    HCT 25.2 (L) 2019    MCV 97.3 2019     2019       Infant: female       Assessment  1.  POD 2 from  Section  2.  acute blood loss anemia from delivery: asymptomatic        Plan: Doing well.    Plan d/c home tomorrow on po iron          Nakia Bartlett MD  2019 9:14 AM

## 2019-01-14 LAB
CYTO UR: NORMAL
LAB AP CASE REPORT: NORMAL
LAB AP CLINICAL INFORMATION: NORMAL
PATH REPORT.FINAL DX SPEC: NORMAL
PATH REPORT.GROSS SPEC: NORMAL

## 2019-01-14 NOTE — DISCHARGE SUMMARY
[facility]   Discharge Summary      Patient: Eleanor Bush      MR#:[id number]  Admission  Diagnosis:   Problem List Items Addressed This Visit     RESOLVED: Term pregnancy    Relevant Medications    lactated ringers bolus 1,000 mL (Completed)    Sod Citrate-Citric Acid (BICITRA) solution 30 mL (Completed)    ceFAZolin in dextrose (ANCEF) IVPB solution 2 g (Completed)    Other Relevant Orders    Admit To Obstetrics Inpatient (Completed)    Abdominal Prep with Clippers (Completed)    Chlorhexadine Skin Prep Unless Otherwise Indicated (Completed)    Insert Indwelling Urinary Catheter (Completed)    SCD (sequential compression devices) (Completed)          Discharge Diagnosis:   1. Term pregnancy        Date of Admission: 1/10/2019  Date of Discharge:  2019    Procedures:  , Low Transverse     1/10/2019    12:36 PM      Service:  Obstetrics    Hospital Course:  Patient underwent  section and remained in the hospital for 2 days.  During that time she remained afebrile and hemodynamically stable.  On the day of discharge, she was eating, ambulating and voiding without difficulty.      Labs  Lab Results   Component Value Date    WBC 12.36 (H) 2019    HGB 8.6 (L) 2019    HCT 25.2 (L) 2019    MCV 97.3 2019     2019    URICACID 5.6 2015    AST 23 2015    ALT 11 2015     2015     Results from last 7 days   Lab Units  19   1207   ABO TYPING   A   RH TYPING   Positive   ANTIBODY SCREEN   Negative       Discharge Medications     Discharge Medications      New Medications      Instructions Start Date   docusate sodium 100 MG capsule   100 mg, Oral, 2 Times Daily PRN      ferrous sulfate 325 (65 FE) MG tablet   325 mg, Oral, 2 Times Daily Before Meals      HYDROcodone-acetaminophen 5-325 MG per tablet  Commonly known as:  NORCO   1-2 tablets, Oral, Every 4 Hours PRN      ibuprofen 600 MG tablet  Commonly known as:   ADVIL,MOTRIN   600 mg, Oral, Every 6 Hours PRN         Continue These Medications      Instructions Start Date   Prenatal 27-1 27-1 MG tablet tablet   1 tablet, Oral, Daily         Stop These Medications    acyclovir 200 MG capsule  Commonly known as:  ZOVIRAX            Discharge Disposition:  To Home    Discharge Condition:  Stable    Discharge Diet: regular    Activity at Discharge: pelvic rest    Follow-up Appointments  2 weeks    Nakia Bartlett MD  01/14/19  8:36 AM

## 2019-03-03 ENCOUNTER — OFFICE VISIT (OUTPATIENT)
Dept: RETAIL CLINIC | Facility: CLINIC | Age: 34
End: 2019-03-03

## 2019-03-03 VITALS
RESPIRATION RATE: 20 BRPM | HEART RATE: 132 BPM | TEMPERATURE: 99 F | WEIGHT: 129.6 LBS | DIASTOLIC BLOOD PRESSURE: 62 MMHG | BODY MASS INDEX: 22.96 KG/M2 | OXYGEN SATURATION: 97 % | HEIGHT: 63 IN | SYSTOLIC BLOOD PRESSURE: 110 MMHG

## 2019-03-03 DIAGNOSIS — J02.9 SORE THROAT: ICD-10-CM

## 2019-03-03 DIAGNOSIS — J02.0 STREP THROAT: Primary | ICD-10-CM

## 2019-03-03 DIAGNOSIS — R68.89 FLU-LIKE SYMPTOMS: ICD-10-CM

## 2019-03-03 LAB
EXPIRATION DATE: ABNORMAL
EXPIRATION DATE: NORMAL
FLUAV AG NPH QL: NEGATIVE
FLUBV AG NPH QL: NEGATIVE
INTERNAL CONTROL: ABNORMAL
INTERNAL CONTROL: NORMAL
Lab: ABNORMAL
Lab: NORMAL
S PYO AG THROAT QL: POSITIVE

## 2019-03-03 PROCEDURE — 87880 STREP A ASSAY W/OPTIC: CPT | Performed by: NURSE PRACTITIONER

## 2019-03-03 PROCEDURE — 99203 OFFICE O/P NEW LOW 30 MIN: CPT | Performed by: NURSE PRACTITIONER

## 2019-03-03 PROCEDURE — 87804 INFLUENZA ASSAY W/OPTIC: CPT | Performed by: NURSE PRACTITIONER

## 2019-03-03 RX ORDER — AMOXICILLIN 500 MG/1
500 CAPSULE ORAL 2 TIMES DAILY
Qty: 20 CAPSULE | Refills: 0 | Status: SHIPPED | OUTPATIENT
Start: 2019-03-03 | End: 2019-03-13

## 2019-03-03 NOTE — PROGRESS NOTES
BENEDICTO@  Eleanor Bush is a 33 y.o. female.   Chief Complaint   Patient presents with   • Flu Symptoms      Eleanor's son has had strep throat within the past week.  She suddenly started feeling sick last night with fever, chills, ear pain, sore throat, congestion and myalgia.        Sore Throat    This is a new problem. The current episode started yesterday. The problem has been unchanged. Neither side of throat is experiencing more pain than the other. Associated symptoms include congestion, ear pain and headaches. Pertinent negatives include no abdominal pain, coughing, diarrhea, drooling, ear discharge, hoarse voice, plugged ear sensation, shortness of breath, stridor, swollen glands, trouble swallowing or vomiting. She has had exposure to strep. She has tried acetaminophen and cool liquids for the symptoms. The treatment provided mild relief.        The following portions of the patient's history were reviewed and updated as appropriate: allergies, current medications, past family history, past medical history, past social history, past surgical history and problem list.    Current Outpatient Medications:   •  Cholecalciferol (VITAMIN D PO), Take  by mouth., Disp: , Rfl:   •  amoxicillin (AMOXIL) 500 MG capsule, Take 1 capsule by mouth 2 (Two) Times a Day for 10 days., Disp: 20 capsule, Rfl: 0  •  ibuprofen (ADVIL,MOTRIN) 600 MG tablet, Take 1 tablet by mouth Every 6 (Six) Hours As Needed for Mild Pain ., Disp: 60 tablet, Rfl: 0  •  Prenatal Vit-Fe Fumarate-FA (PRENATAL 27-1) 27-1 MG tablet tablet, Take 1 tablet by mouth Daily., Disp: , Rfl:     No Known Allergies    Review of Systems   Constitutional: Positive for chills and fatigue. Negative for appetite change and fever.   HENT: Positive for congestion, ear pain, postnasal drip, sinus pressure and sore throat. Negative for drooling, ear discharge, hoarse voice, rhinorrhea, sneezing, tinnitus and trouble swallowing.    Eyes: Negative for redness  "and itching.   Respiratory: Negative for cough, shortness of breath, wheezing and stridor.    Gastrointestinal: Negative for abdominal pain, diarrhea, nausea and vomiting.   Musculoskeletal: Positive for myalgias.   Skin: Negative for rash.   Neurological: Positive for headaches. Negative for dizziness.       Objective     Visit Vitals  /62   Pulse (!) 132   Temp 99 °F (37.2 °C) (Oral)   Resp 20   Ht 160 cm (63\")   Wt 58.8 kg (129 lb 9.6 oz)   LMP 04/02/2018   SpO2 97%   Breastfeeding? Yes   BMI 22.96 kg/m²         Physical Exam   Constitutional: She is oriented to person, place, and time. She appears well-developed and well-nourished. She appears ill. No distress.   HENT:   Head: Normocephalic.   Right Ear: Tympanic membrane, external ear and ear canal normal.   Left Ear: Tympanic membrane, external ear and ear canal normal.   Nose: Nose normal.   Mouth/Throat: Uvula is midline and mucous membranes are normal. Posterior oropharyngeal erythema present. Tonsils are 3+ on the right. Tonsils are 2+ on the left. No tonsillar exudate.   Eyes: Conjunctivae are normal.   Cardiovascular: Normal rate, regular rhythm and normal heart sounds.   Pulmonary/Chest: Effort normal and breath sounds normal.   Lymphadenopathy:        Head (right side): Tonsillar adenopathy present.        Head (left side): Tonsillar adenopathy present.     She has no cervical adenopathy.   Neurological: She is alert and oriented to person, place, and time.       Lab Results (last 24 hours)     Procedure Component Value Units Date/Time    POC Rapid Strep A [[id number]]  (Abnormal) Collected:  03/03/19 1421    Specimen:  Swab Updated:  03/03/19 1421     Rapid Strep A Screen Positive     Internal Control Passed     Lot Number [id number]     Expiration Date 08/31/20    POCT Influenza A/B [[id number]]  (Normal) Collected:  03/03/19 1421    Specimen:  Swab Updated:  03/03/19 1421     Rapid Influenza A Ag Negative     Rapid Influenza B Ag Negative     " Internal Control Passed     Lot Number [id number]     Expiration Date 03/21/21          Assessment/Plan   Eleanor was seen today for flu symptoms.    Diagnoses and all orders for this visit:    Strep throat  -     amoxicillin (AMOXIL) 500 MG capsule; Take 1 capsule by mouth 2 (Two) Times a Day for 10 days. Advised to monitor baby for rash, diarrhea, or thrush while taking antibiotic.  Call pediatrician if these symptoms occur.    - Drink plenty of clear, decaffeinated fluids, as tolerated.  - Acetaminophen, per package directions, as needed for sore throat, fever > 100, mild pain  - Discard toothbrush after 24 hours on antibiotic.  Do not share eating/drinking utensils, wash in hot water.  - Warm salt water gargles, or OTC lozenges/sprays per package directions, as needed for sore throat.    Sore throat  -     POC Rapid Strep A    Flu-like symptoms  -     POCT Influenza A/B    An After Visit Summary was reviewed, printed and given to the patient.  Understanding verbalized and patient agreed with treatment plan.  If symptom(s) worsen or fail to improve, return to clinic, follow up with PCP or go to [facility].

## 2019-07-07 ENCOUNTER — HOSPITAL ENCOUNTER (EMERGENCY)
Facility: HOSPITAL | Age: 34
Discharge: HOME | End: 2019-07-07
Attending: EMERGENCY MEDICINE | Admitting: EMERGENCY MEDICINE

## 2019-07-07 VITALS
DIASTOLIC BLOOD PRESSURE: 71 MMHG | BODY MASS INDEX: 23.92 KG/M2 | HEIGHT: 63 IN | OXYGEN SATURATION: 97 % | TEMPERATURE: 98.6 F | WEIGHT: 135 LBS | SYSTOLIC BLOOD PRESSURE: 125 MMHG | HEART RATE: 86 BPM | RESPIRATION RATE: 16 BRPM

## 2019-07-07 DIAGNOSIS — B86 SCABIES: Primary | ICD-10-CM

## 2019-07-07 PROCEDURE — 99283 EMERGENCY DEPT VISIT LOW MDM: CPT

## 2019-07-07 RX ORDER — PHENAZOPYRIDINE HYDROCHLORIDE 100 MG/1
100 TABLET, FILM COATED ORAL 3 TIMES DAILY PRN
COMMUNITY
End: 2021-10-13

## 2019-07-07 RX ORDER — PERMETHRIN 50 MG/G
CREAM TOPICAL ONCE
Qty: 60 G | Refills: 0 | Status: SHIPPED | OUTPATIENT
Start: 2019-07-07 | End: 2019-07-07

## 2019-07-07 RX ORDER — CEPHALEXIN 500 MG/1
500 CAPSULE ORAL 2 TIMES DAILY
COMMUNITY
End: 2021-10-13

## 2019-07-07 NOTE — DISCHARGE INSTRUCTIONS
Follow up with one of the physician centers below to setup primary care.    [facility], [phone], [address], [address], [place], [address]    [facility], [phone], [address], [place], [address]    [facility], [phone], [address] [place], [address];     [facility], [phone], [address], [facility]    Follow up with one of the [facility] Primary Care Providers below to setup primary care. If you need assistance coordinating a primary care appointment with a [facility] Primary Care Provider, please contact the Primary Care Coordinators at [phone] for appointment scheduling.    [facility], Primary Care   [address], [address]   [place], [place] [address] [phone]    [facility] Internal Medicine & Endocrinology  [address], [address]  [place] [address] [phone]    [facility]  [address], [address]   [place] [address] [phone]    [facility] Primary Care  [address], [address]  [place], [place] [address] [phone]    [facility], Primary Care,   [address], [address]   [place], [place] [address] [phone]    [facility]  [address], [address]  [place], [place] [address] [phone]    [facility], Primary Care  [address], [address]  [place], [place] [address] [phone]    [facility] Internal Medicine & Pediatrics  [address], [address]   [place] [address] [phone]    [facility], Primary Care  [address], [place] [address] [phone]      [facility] Primary [facility]  [address], [address]   [place] [address] [phone]    [facility]  [address] [place] [address] [place] [address] [phone]

## 2019-07-07 NOTE — ED PROVIDER NOTES
Subjective   Eleanor Bush is a 33 y.o.female who presents to the emergency department with complaints of a rash.   The patient's rash is located on her abdomen, legs, ankles, armpits, and back. Her rash began three days ago. She mentions itchiness and redness, and that her dog has similar spots.  She also notes that everyone who has been in contact with the dog has a similar rash except her children. There are no other acute complaints at this time.         History provided by:  Patient  Rash   Location:  Full body  Quality: itchiness and redness    Severity:  Moderate  Onset quality:  Sudden  Duration:  3 days  Timing:  Constant  Progression:  Spreading  Chronicity:  New  Context: animal contact        Review of Systems   Skin: Positive for rash.   All other systems reviewed and are negative.      Past Medical History:   Diagnosis Date   • Abnormal Pap smear of cervix     HPV   • Anxiety and depression     saw a therapist; no medication   • HPV in female    • HPV in female    • HSV-2 infection 2018   • Seizures (CMS/[facility])     Last seizure at 6 years old. Took meds as child.    • Substance abuse (CMS/Formerly [facility])     5 years in [facility]    • UTI (urinary tract infection)    • Varicella     as a child        No Known Allergies    Past Surgical History:   Procedure Laterality Date   • CERVICAL BIOPSY      HPV   • CERVICAL BIOPSY     •  SECTION PRIMARY  2015   •  SECTION WITH TUBAL Bilateral 1/10/2019    Procedure:  SECTION REPEAT WITH TUBAL;  Surgeon: Nakia Bartlett MD;  Location: [facility] LABOR DELIVERY;  Service: Obstetrics/Gynecology   • TUBAL ABDOMINAL LIGATION     • WISDOM TOOTH EXTRACTION         Family History   Problem Relation Age of Onset   • Leukemia Mother        Social History     Socioeconomic History   • Marital status:      Spouse name: Not on file   • Number of children: Not on file   • Years of education: Not on file   • Highest education level: Not  on file   Tobacco Use   • Smoking status: Current Every Day Smoker     Packs/day: 1.00     Years: 15.00     Pack years: 15.00     Types: Cigarettes   • Smokeless tobacco: Never Used   Substance and Sexual Activity   • Alcohol use: Yes     Comment: SOCIAL   • Drug use: No   • Sexual activity: Yes     Partners: Male     Birth control/protection: Surgical         Objective   Physical Exam   Constitutional: She is oriented to person, place, and time. She appears well-developed and well-nourished. No distress.   HENT:   Head: Normocephalic and atraumatic.   Nose: Nose normal.   Eyes: Conjunctivae are normal. No scleral icterus.   Neck: Normal range of motion. Neck supple.   Cardiovascular: Normal rate and regular rhythm.   Pulmonary/Chest: Effort normal. No respiratory distress.   Abdominal: There is no tenderness.   Musculoskeletal: Normal range of motion.   Neurological: She is alert and oriented to person, place, and time.   Skin: Skin is warm and dry. Rash noted.   Patient has raised rash over abdomen, chest, back, arms, and legs with multiple excoriations and severe itching. Looks consistent with scabies.    Psychiatric: She has a normal mood and affect. Her behavior is normal.   Nursing note and vitals reviewed.      Procedures         ED Course     No results found for this or any previous visit (from the past 24 hour(s)).  Note: In addition to lab results from this visit, the labs listed above may include labs taken at another facility or during a different encounter within the last 24 hours. Please correlate lab times with ED admission and discharge times for further clarification of the services performed during this visit.    No orders to display     Vitals:    07/07/19 1734 07/07/19 1839   BP: 126/72 125/71   BP Location: Left arm Left arm   Patient Position: Sitting Sitting   Pulse: 84 86   Resp: 16 16   Temp: 98.4 °F (36.9 °C) 98.6 °F (37 °C)   TempSrc: Oral Oral   SpO2: 96% 97%   Weight: 61.2 kg (135 lb)   "  Height: 160 cm (63\")      Medications - No data to display  ECG/EMG Results (last 24 hours)     ** No results found for the last 24 hours. **        No orders to display                       MDM  Number of Diagnoses or Management Options  Scabies: new and requires workup     Amount and/or Complexity of Data Reviewed  Discuss the patient with other providers: yes    Patient Progress  Patient progress: stable      Final diagnoses:   Scabies       Documentation assistance provided by an Palumbo.  Information recorded by the scribe was done at my direction and has been verified and validated by me.     Maya Palumbo  07/07/19 1913       Maya Palumbo  07/07/19 1914       Parish Mtz PA  07/10/19 1640    "

## 2021-10-13 ENCOUNTER — OFFICE VISIT (OUTPATIENT)
Dept: INTERNAL MEDICINE | Facility: CLINIC | Age: 36
End: 2021-10-13

## 2021-10-13 ENCOUNTER — LAB (OUTPATIENT)
Dept: LAB | Facility: HOSPITAL | Age: 36
End: 2021-10-13

## 2021-10-13 VITALS
HEIGHT: 63 IN | DIASTOLIC BLOOD PRESSURE: 88 MMHG | TEMPERATURE: 97.3 F | OXYGEN SATURATION: 99 % | SYSTOLIC BLOOD PRESSURE: 152 MMHG | WEIGHT: 174.2 LBS | HEART RATE: 69 BPM | BODY MASS INDEX: 30.87 KG/M2

## 2021-10-13 DIAGNOSIS — T14.8XXA BRUISING: ICD-10-CM

## 2021-10-13 DIAGNOSIS — F33.2 SEVERE EPISODE OF RECURRENT MAJOR DEPRESSIVE DISORDER, WITHOUT PSYCHOTIC FEATURES (HCC): ICD-10-CM

## 2021-10-13 DIAGNOSIS — K21.9 GASTROESOPHAGEAL REFLUX DISEASE WITHOUT ESOPHAGITIS: ICD-10-CM

## 2021-10-13 DIAGNOSIS — G44.89 OTHER HEADACHE SYNDROME: ICD-10-CM

## 2021-10-13 DIAGNOSIS — T14.8XXA BRUISING: Primary | ICD-10-CM

## 2021-10-13 PROCEDURE — 84443 ASSAY THYROID STIM HORMONE: CPT | Performed by: NURSE PRACTITIONER

## 2021-10-13 PROCEDURE — 99214 OFFICE O/P EST MOD 30 MIN: CPT | Performed by: NURSE PRACTITIONER

## 2021-10-13 PROCEDURE — 85025 COMPLETE CBC W/AUTO DIFF WBC: CPT

## 2021-10-13 PROCEDURE — 82607 VITAMIN B-12: CPT | Performed by: NURSE PRACTITIONER

## 2021-10-13 PROCEDURE — 80053 COMPREHEN METABOLIC PANEL: CPT | Performed by: NURSE PRACTITIONER

## 2021-10-13 RX ORDER — FAMOTIDINE 20 MG/1
20 TABLET, FILM COATED ORAL 2 TIMES DAILY
Qty: 60 TABLET | Refills: 2 | Status: SHIPPED | OUTPATIENT
Start: 2021-10-13 | End: 2021-11-16 | Stop reason: SDUPTHER

## 2021-10-13 NOTE — PROGRESS NOTES
"Subjective   Chief Complaint   Patient presents with   • Establish Care     BRUSING        Eleanor No is a 35 y.o. female here today as a new patient to establish care. Pt complains of excessive bruising.  She states this has been going on for \"years\".  She states that her mother has leukemia so she is concerned.    She complains of a headache today.  This started about 4 days ago.  Denies congestion.  It's at the base of her skull.  Worse with movement of her head.  Wakes up with the headache.  Excedrin helps for a few hours.  Complains of nausea for about a month.  She states that she has heartburn on a daily.  She has been eating a lot of Tums without much relief.   Denies any abdominal pain.    Complains of depression.  Started about 4 years ago.  Last year she took Zoloft while in rehab and it helped.  Denies current suicidal thoughts but she has had these in the past.  She has scheduled an appt with a therapist.  Requesting to restart the Zoloft.    Review of Systems   Constitutional: Negative for activity change, appetite change and fatigue.   HENT: Negative for congestion.    Respiratory: Negative for cough and shortness of breath.    Cardiovascular: Negative for chest pain and leg swelling.   Gastrointestinal: Positive for nausea and GERD. Negative for abdominal pain.   Skin: Positive for color change.   Neurological: Positive for headache. Negative for dizziness, weakness and confusion.   Psychiatric/Behavioral: Positive for depressed mood. Negative for behavioral problems, decreased concentration and suicidal ideas. The patient is nervous/anxious.         Past Medical History:   Diagnosis Date   • Abnormal Pap smear of cervix     HPV   • Anxiety and depression 2018    saw a therapist; no medication   • HPV in female 2014   • HPV in female 2014   • HSV-2 infection 03/2018   • Seizures (HCC)     Last seizure at 6 years old. Took meds as child.    • Substance abuse (HCC)     5 years in [facility]    • " "UTI (urinary tract infection)    • Varicella     as a child      Past Surgical History:   Procedure Laterality Date   • CERVICAL BIOPSY      HPV   • CERVICAL BIOPSY     •  SECTION PRIMARY  2015   •  SECTION WITH TUBAL Bilateral 1/10/2019    Procedure:  SECTION REPEAT WITH TUBAL;  Surgeon: Nakia Bartlett MD;  Location: [facility] LABOR DELIVERY;  Service: Obstetrics/Gynecology   • TUBAL ABDOMINAL LIGATION     • WISDOM TOOTH EXTRACTION       Family History   Problem Relation Age of Onset   • Leukemia Mother    • Kidney disease Mother    • Diabetes Father    • Hypertension Father    • Diabetes Brother      Social History     Tobacco Use   Smoking Status Current Every Day Smoker   • Packs/day: 1.00   • Years: 15.00   • Pack years: 15.00   • Types: Cigarettes   Smokeless Tobacco Never Used      Social History     Substance and Sexual Activity   Alcohol Use Yes    Comment: SOCIAL      Current Outpatient Medications on File Prior to Visit   Medication Sig   • [DISCONTINUED] cephalexin (KEFLEX) 500 MG capsule Take 500 mg by mouth 2 (Two) Times a Day.   • [DISCONTINUED] phenazopyridine (PYRIDIUM) 100 MG tablet Take 100 mg by mouth 3 (Three) Times a Day As Needed for bladder spasms.     No current facility-administered medications on file prior to visit.     No Known Allergies    Objective   Vitals:    10/13/21 0834   BP: 152/88   Pulse: 69   Temp: 97.3 °F (36.3 °C)   TempSrc: Temporal   SpO2: 99%   Weight: 79 kg (174 lb 3.2 oz)   Height: 160 cm (63\")     Body mass index is 30.86 kg/m².    Physical Exam  Vitals and nursing note reviewed.   HENT:      Head: Normocephalic.   Eyes:      Pupils: Pupils are equal, round, and reactive to light.   Cardiovascular:      Rate and Rhythm: Normal rate and regular rhythm.      Pulses: Normal pulses.      Heart sounds: Normal heart sounds.   Pulmonary:      Effort: Pulmonary effort is normal.      Breath sounds: Normal breath sounds.   Skin:     General: " Skin is warm and dry.      Capillary Refill: Capillary refill takes less than 2 seconds.      Findings: No bruising or rash.   Neurological:      General: No focal deficit present.      Mental Status: She is alert and oriented to person, place, and time.      Gait: Gait is intact.   Psychiatric:         Attention and Perception: Attention normal.         Mood and Affect: Mood normal.         Speech: Speech normal.         Behavior: Behavior normal.         Thought Content: Thought content normal.         Cognition and Memory: Cognition and memory normal.         Judgment: Judgment normal.         Assessment/Plan   Problem List Items Addressed This Visit     None      Visit Diagnoses     Bruising    -  Primary    Relevant Orders    Comprehensive Metabolic Panel    CBC Auto Differential    Vitamin B12    TSH Rfx On Abnormal To Free T4    Gastroesophageal reflux disease without esophagitis        Relevant Medications    famotidine (Pepcid) 20 MG tablet    Severe episode of recurrent major depressive disorder, without psychotic features (HCC)        Relevant Medications    sertraline (Zoloft) 50 MG tablet    Other headache syndrome        Relevant Medications    sertraline (Zoloft) 50 MG tablet         Labs to eval bruising  Pepcid to try for heartburn  Start Zoloft for depression, d/w pt can take 4-6 weeks to get the full effect of  The medication, verb understanding  Try changing pillow to see if headache improves, sounds like tension         Current Outpatient Medications:   •  famotidine (Pepcid) 20 MG tablet, Take 1 tablet by mouth 2 (Two) Times a Day., Disp: 60 tablet, Rfl: 2  •  sertraline (Zoloft) 50 MG tablet, Take 1 tablet by mouth Daily., Disp: 30 tablet, Rfl: 2       Plan of care reviewed with the patient at the conclusion of today's visit.  Education was provided regarding diagnosis, management, and any prescribed or recommended OTC medications.  Patient verbalized understanding of and agreement with  management plan.     Return in about 4 weeks (around 11/10/2021) for Recheck depression and heartburn.        CARLEE Stoll

## 2021-10-14 LAB
ALBUMIN SERPL-MCNC: 4.4 G/DL (ref 3.5–5.2)
ALBUMIN/GLOB SERPL: 1.7 G/DL
ALP SERPL-CCNC: 76 U/L (ref 39–117)
ALT SERPL W P-5'-P-CCNC: 14 U/L (ref 1–33)
ANION GAP SERPL CALCULATED.3IONS-SCNC: 9.9 MMOL/L (ref 5–15)
AST SERPL-CCNC: 16 U/L (ref 1–32)
BASOPHILS # BLD AUTO: 0.09 10*3/MM3 (ref 0–0.2)
BASOPHILS NFR BLD AUTO: 0.9 % (ref 0–1.5)
BILIRUB SERPL-MCNC: 0.3 MG/DL (ref 0–1.2)
BUN SERPL-MCNC: 15 MG/DL (ref 6–20)
BUN/CREAT SERPL: 16 (ref 7–25)
CALCIUM SPEC-SCNC: 9.3 MG/DL (ref 8.6–10.5)
CHLORIDE SERPL-SCNC: 106 MMOL/L (ref 98–107)
CO2 SERPL-SCNC: 22.1 MMOL/L (ref 22–29)
CREAT SERPL-MCNC: 0.94 MG/DL (ref 0.57–1)
DEPRECATED RDW RBC AUTO: 44.5 FL (ref 37–54)
EOSINOPHIL # BLD AUTO: 0.33 10*3/MM3 (ref 0–0.4)
EOSINOPHIL NFR BLD AUTO: 3.3 % (ref 0.3–6.2)
ERYTHROCYTE [DISTWIDTH] IN BLOOD BY AUTOMATED COUNT: 12.8 % (ref 12.3–15.4)
GFR SERPL CREATININE-BSD FRML MDRD: 69 ML/MIN/1.73
GLOBULIN UR ELPH-MCNC: 2.6 GM/DL
GLUCOSE SERPL-MCNC: 79 MG/DL (ref 65–99)
HCT VFR BLD AUTO: 37.9 % (ref 34–46.6)
HGB BLD-MCNC: 12.7 G/DL (ref 12–15.9)
IMM GRANULOCYTES # BLD AUTO: 0.04 10*3/MM3 (ref 0–0.05)
IMM GRANULOCYTES NFR BLD AUTO: 0.4 % (ref 0–0.5)
LYMPHOCYTES # BLD AUTO: 2.91 10*3/MM3 (ref 0.7–3.1)
LYMPHOCYTES NFR BLD AUTO: 28.8 % (ref 19.6–45.3)
MCH RBC QN AUTO: 31.9 PG (ref 26.6–33)
MCHC RBC AUTO-ENTMCNC: 33.5 G/DL (ref 31.5–35.7)
MCV RBC AUTO: 95.2 FL (ref 79–97)
MONOCYTES # BLD AUTO: 0.95 10*3/MM3 (ref 0.1–0.9)
MONOCYTES NFR BLD AUTO: 9.4 % (ref 5–12)
NEUTROPHILS NFR BLD AUTO: 5.78 10*3/MM3 (ref 1.7–7)
NEUTROPHILS NFR BLD AUTO: 57.2 % (ref 42.7–76)
NRBC BLD AUTO-RTO: 0 /100 WBC (ref 0–0.2)
PLATELET # BLD AUTO: 295 10*3/MM3 (ref 140–450)
PMV BLD AUTO: 10.2 FL (ref 6–12)
POTASSIUM SERPL-SCNC: 4.7 MMOL/L (ref 3.5–5.2)
PROT SERPL-MCNC: 7 G/DL (ref 6–8.5)
RBC # BLD AUTO: 3.98 10*6/MM3 (ref 3.77–5.28)
SODIUM SERPL-SCNC: 138 MMOL/L (ref 136–145)
TSH SERPL DL<=0.05 MIU/L-ACNC: 2.64 UIU/ML (ref 0.27–4.2)
VIT B12 BLD-MCNC: 578 PG/ML (ref 211–946)
WBC # BLD AUTO: 10.1 10*3/MM3 (ref 3.4–10.8)

## 2021-11-16 ENCOUNTER — OFFICE VISIT (OUTPATIENT)
Dept: INTERNAL MEDICINE | Facility: CLINIC | Age: 36
End: 2021-11-16

## 2021-11-16 VITALS
BODY MASS INDEX: 29.38 KG/M2 | DIASTOLIC BLOOD PRESSURE: 96 MMHG | SYSTOLIC BLOOD PRESSURE: 136 MMHG | HEIGHT: 63 IN | HEART RATE: 97 BPM | WEIGHT: 165.8 LBS | OXYGEN SATURATION: 97 % | TEMPERATURE: 97.3 F

## 2021-11-16 DIAGNOSIS — R68.82 DECREASED LIBIDO: ICD-10-CM

## 2021-11-16 DIAGNOSIS — F32.5 DEPRESSION, MAJOR, IN REMISSION (HCC): Primary | ICD-10-CM

## 2021-11-16 DIAGNOSIS — I10 ESSENTIAL HYPERTENSION: ICD-10-CM

## 2021-11-16 DIAGNOSIS — R63.5 WEIGHT GAIN: ICD-10-CM

## 2021-11-16 DIAGNOSIS — K21.9 GASTROESOPHAGEAL REFLUX DISEASE WITHOUT ESOPHAGITIS: ICD-10-CM

## 2021-11-16 PROCEDURE — 99214 OFFICE O/P EST MOD 30 MIN: CPT | Performed by: NURSE PRACTITIONER

## 2021-11-16 RX ORDER — HYDROCHLOROTHIAZIDE 12.5 MG/1
12.5 TABLET ORAL DAILY
Qty: 30 TABLET | Refills: 0 | Status: SHIPPED | OUTPATIENT
Start: 2021-11-16 | End: 2021-12-07

## 2021-11-16 RX ORDER — FAMOTIDINE 20 MG/1
20 TABLET, FILM COATED ORAL 2 TIMES DAILY
Qty: 60 TABLET | Refills: 5 | Status: SHIPPED | OUTPATIENT
Start: 2021-11-16 | End: 2022-06-08

## 2021-11-16 NOTE — PROGRESS NOTES
"Chief Complaint   Patient presents with   • Depression     f/u   • Heartburn     f/u       HPI  Eleanormichelle No is a 36 y.o. female presents for follow-up on depression and heartburn.  Pt was started on Zoloft at her last appt.  She states she's feeling much better.  She states she is more motivated, cleaning more and playing with her kids. Still has some down days but nothing like it was.  No crying episodes.  She would like to keep the dose where it is.    Heartburn well controlled with Pepcid.  Has occasional nausea of the mornings but much improved.  Wanting a hormone panel done.  Has been gaining weight and doesn't have a sex drive.  Has fatigue.  Periods are irregular.  Goes to [employer] for her paps.  She is due.    The following portions of the patient's history were reviewed and updated as appropriate: allergies, current medications, past family history, past medical history, past social history, past surgical history and problem list.    Subjective  Review of Systems   Constitutional: Positive for fatigue and unexpected weight gain. Negative for activity change and appetite change.   HENT: Negative for congestion.    Respiratory: Negative for cough and shortness of breath.    Cardiovascular: Negative for chest pain and leg swelling.   Gastrointestinal: Negative for abdominal pain.   Genitourinary: Positive for menstrual problem.   Neurological: Negative for dizziness, weakness and confusion.   Psychiatric/Behavioral: Negative for behavioral problems and decreased concentration.       Objective  Visit Vitals  /96   Pulse 97   Temp 97.3 °F (36.3 °C) (Temporal)   Ht 160 cm (63\")   Wt 75.2 kg (165 lb 12.8 oz)   SpO2 97%   Breastfeeding No   BMI 29.37 kg/m²        Physical Exam  Vitals and nursing note reviewed.   HENT:      Head: Normocephalic.   Eyes:      Pupils: Pupils are equal, round, and reactive to light.   Pulmonary:      Effort: Pulmonary effort is normal.   Skin:     General: Skin is " warm and dry.      Capillary Refill: Capillary refill takes less than 2 seconds.   Neurological:      General: No focal deficit present.      Mental Status: She is alert and oriented to person, place, and time.      Gait: Gait is intact.   Psychiatric:         Attention and Perception: Attention normal.         Mood and Affect: Mood normal.         Behavior: Behavior normal.         Thought Content: Thought content normal.         Judgment: Judgment normal.          Procedures     Assessment and Plan  Diagnoses and all orders for this visit:    1. Depression, major, in remission (HCC) (Primary)  -     sertraline (Zoloft) 50 MG tablet; Take 1 tablet by mouth Daily.  Dispense: 30 tablet; Refill: 5    2. Gastroesophageal reflux disease without esophagitis  -     famotidine (Pepcid) 20 MG tablet; Take 1 tablet by mouth 2 (Two) Times a Day.  Dispense: 60 tablet; Refill: 5    3. Weight gain    4. Decreased libido  -     Estrogens, Total  -     Estrogens, Fractionated  -     FSH & LH  -     Progesterone    5. Essential hypertension  -     hydroCHLOROthiazide (HYDRODIURIL) 12.5 MG tablet; Take 1 tablet by mouth Daily.  Dispense: 30 tablet; Refill: 0    Zoloft working well for depression, will continue  Pepcid working well, will cont  Hormone labs per pt request  Will start HCTZ for BP, has been elevated the past 2 visits - discussed decreasing caffeine and sodium from diet    Return in about 3 weeks (around 12/7/2021) for Recheck HTN.          CARLEE Stoll

## 2021-11-18 LAB
ESTRADIOL SERPL-MCNC: 55.8 PG/ML
ESTROGEN SERPL-MCNC: 184 PG/ML
ESTRONE SERPL-MCNC: 111 PG/ML
FSH SERPL-ACNC: 4 MIU/ML
LH SERPL-ACNC: 9 MIU/ML
PROGEST SERPL-MCNC: 3.1 NG/ML

## 2021-12-07 DIAGNOSIS — I10 ESSENTIAL HYPERTENSION: ICD-10-CM

## 2021-12-07 RX ORDER — HYDROCHLOROTHIAZIDE 12.5 MG/1
TABLET ORAL
Qty: 30 TABLET | Refills: 0 | Status: SHIPPED | OUTPATIENT
Start: 2021-12-07 | End: 2022-01-20

## 2022-01-19 DIAGNOSIS — I10 ESSENTIAL HYPERTENSION: ICD-10-CM

## 2022-01-20 RX ORDER — HYDROCHLOROTHIAZIDE 12.5 MG/1
TABLET ORAL
Qty: 30 TABLET | Refills: 0 | Status: SHIPPED | OUTPATIENT
Start: 2022-01-20 | End: 2022-03-23 | Stop reason: SDUPTHER

## 2022-03-23 ENCOUNTER — APPOINTMENT (OUTPATIENT)
Dept: GENERAL RADIOLOGY | Facility: HOSPITAL | Age: 37
End: 2022-03-23

## 2022-03-23 ENCOUNTER — TELEPHONE (OUTPATIENT)
Dept: INTERNAL MEDICINE | Facility: CLINIC | Age: 37
End: 2022-03-23

## 2022-03-23 ENCOUNTER — HOSPITAL ENCOUNTER (EMERGENCY)
Facility: HOSPITAL | Age: 37
Discharge: HOME | End: 2022-03-23
Attending: EMERGENCY MEDICINE | Admitting: EMERGENCY MEDICINE

## 2022-03-23 ENCOUNTER — APPOINTMENT (OUTPATIENT)
Dept: CT IMAGING | Facility: HOSPITAL | Age: 37
End: 2022-03-23

## 2022-03-23 VITALS
HEIGHT: 63 IN | WEIGHT: 166 LBS | BODY MASS INDEX: 29.41 KG/M2 | HEART RATE: 108 BPM | RESPIRATION RATE: 14 BRPM | TEMPERATURE: 98.2 F | OXYGEN SATURATION: 98 % | SYSTOLIC BLOOD PRESSURE: 126 MMHG | DIASTOLIC BLOOD PRESSURE: 96 MMHG

## 2022-03-23 DIAGNOSIS — R07.9 CHEST PAIN, UNSPECIFIED TYPE: Primary | ICD-10-CM

## 2022-03-23 DIAGNOSIS — I10 ESSENTIAL HYPERTENSION: ICD-10-CM

## 2022-03-23 LAB
ALBUMIN SERPL-MCNC: 4.4 G/DL (ref 3.5–5.2)
ALBUMIN/GLOB SERPL: 1.6 G/DL
ALP SERPL-CCNC: 98 U/L (ref 39–117)
ALT SERPL W P-5'-P-CCNC: 15 U/L (ref 1–33)
ANION GAP SERPL CALCULATED.3IONS-SCNC: 12 MMOL/L (ref 5–15)
AST SERPL-CCNC: 18 U/L (ref 1–32)
BASOPHILS # BLD AUTO: 0.06 10*3/MM3 (ref 0–0.2)
BASOPHILS NFR BLD AUTO: 0.7 % (ref 0–1.5)
BILIRUB SERPL-MCNC: 0.3 MG/DL (ref 0–1.2)
BUN SERPL-MCNC: 11 MG/DL (ref 6–20)
BUN/CREAT SERPL: 12.1 (ref 7–25)
CALCIUM SPEC-SCNC: 9.2 MG/DL (ref 8.6–10.5)
CHLORIDE SERPL-SCNC: 109 MMOL/L (ref 98–107)
CO2 SERPL-SCNC: 21 MMOL/L (ref 22–29)
CREAT SERPL-MCNC: 0.91 MG/DL (ref 0.57–1)
DEPRECATED RDW RBC AUTO: 48 FL (ref 37–54)
EGFRCR SERPLBLD CKD-EPI 2021: 85.1 ML/MIN/1.73
EOSINOPHIL # BLD AUTO: 0.22 10*3/MM3 (ref 0–0.4)
EOSINOPHIL NFR BLD AUTO: 2.5 % (ref 0.3–6.2)
ERYTHROCYTE [DISTWIDTH] IN BLOOD BY AUTOMATED COUNT: 14.4 % (ref 12.3–15.4)
GLOBULIN UR ELPH-MCNC: 2.8 GM/DL
GLUCOSE SERPL-MCNC: 94 MG/DL (ref 65–99)
HCT VFR BLD AUTO: 36.1 % (ref 34–46.6)
HGB BLD-MCNC: 11.6 G/DL (ref 12–15.9)
HOLD SPECIMEN: NORMAL
IMM GRANULOCYTES # BLD AUTO: 0.03 10*3/MM3 (ref 0–0.05)
IMM GRANULOCYTES NFR BLD AUTO: 0.3 % (ref 0–0.5)
LIPASE SERPL-CCNC: 35 U/L (ref 13–60)
LYMPHOCYTES # BLD AUTO: 3 10*3/MM3 (ref 0.7–3.1)
LYMPHOCYTES NFR BLD AUTO: 33.5 % (ref 19.6–45.3)
MCH RBC QN AUTO: 29.1 PG (ref 26.6–33)
MCHC RBC AUTO-ENTMCNC: 32.1 G/DL (ref 31.5–35.7)
MCV RBC AUTO: 90.5 FL (ref 79–97)
MONOCYTES # BLD AUTO: 0.8 10*3/MM3 (ref 0.1–0.9)
MONOCYTES NFR BLD AUTO: 8.9 % (ref 5–12)
NEUTROPHILS NFR BLD AUTO: 4.85 10*3/MM3 (ref 1.7–7)
NEUTROPHILS NFR BLD AUTO: 54.1 % (ref 42.7–76)
NRBC BLD AUTO-RTO: 0 /100 WBC (ref 0–0.2)
NT-PROBNP SERPL-MCNC: 239.6 PG/ML (ref 0–450)
PLATELET # BLD AUTO: 318 10*3/MM3 (ref 140–450)
PMV BLD AUTO: 10.1 FL (ref 6–12)
POTASSIUM SERPL-SCNC: 3.7 MMOL/L (ref 3.5–5.2)
PROT SERPL-MCNC: 7.2 G/DL (ref 6–8.5)
RBC # BLD AUTO: 3.99 10*6/MM3 (ref 3.77–5.28)
SODIUM SERPL-SCNC: 142 MMOL/L (ref 136–145)
TROPONIN T SERPL-MCNC: <0.01 NG/ML (ref 0–0.03)
TROPONIN T SERPL-MCNC: <0.01 NG/ML (ref 0–0.03)
WBC NRBC COR # BLD: 8.96 10*3/MM3 (ref 3.4–10.8)
WHOLE BLOOD HOLD SPECIMEN: NORMAL
WHOLE BLOOD HOLD SPECIMEN: NORMAL

## 2022-03-23 PROCEDURE — 0 IOPAMIDOL PER 1 ML: Performed by: EMERGENCY MEDICINE

## 2022-03-23 PROCEDURE — 84484 ASSAY OF TROPONIN QUANT: CPT | Performed by: EMERGENCY MEDICINE

## 2022-03-23 PROCEDURE — 85025 COMPLETE CBC W/AUTO DIFF WBC: CPT

## 2022-03-23 PROCEDURE — 71275 CT ANGIOGRAPHY CHEST: CPT

## 2022-03-23 PROCEDURE — 96374 THER/PROPH/DIAG INJ IV PUSH: CPT

## 2022-03-23 PROCEDURE — 83690 ASSAY OF LIPASE: CPT

## 2022-03-23 PROCEDURE — 99283 EMERGENCY DEPT VISIT LOW MDM: CPT

## 2022-03-23 PROCEDURE — 25010000002 KETOROLAC TROMETHAMINE PER 15 MG: Performed by: NURSE PRACTITIONER

## 2022-03-23 PROCEDURE — 93005 ELECTROCARDIOGRAM TRACING: CPT

## 2022-03-23 PROCEDURE — 80053 COMPREHEN METABOLIC PANEL: CPT

## 2022-03-23 PROCEDURE — 71045 X-RAY EXAM CHEST 1 VIEW: CPT

## 2022-03-23 PROCEDURE — 93005 ELECTROCARDIOGRAM TRACING: CPT | Performed by: EMERGENCY MEDICINE

## 2022-03-23 PROCEDURE — 83880 ASSAY OF NATRIURETIC PEPTIDE: CPT | Performed by: EMERGENCY MEDICINE

## 2022-03-23 RX ORDER — ASPIRIN 81 MG/1
324 TABLET, CHEWABLE ORAL ONCE
Status: DISCONTINUED | OUTPATIENT
Start: 2022-03-23 | End: 2022-03-23 | Stop reason: HOSPADM

## 2022-03-23 RX ORDER — SODIUM CHLORIDE 0.9 % (FLUSH) 0.9 %
10 SYRINGE (ML) INJECTION AS NEEDED
Status: DISCONTINUED | OUTPATIENT
Start: 2022-03-23 | End: 2022-03-23 | Stop reason: HOSPADM

## 2022-03-23 RX ORDER — HYDROCHLOROTHIAZIDE 12.5 MG/1
12.5 TABLET ORAL DAILY
Qty: 30 TABLET | Refills: 0 | Status: SHIPPED | OUTPATIENT
Start: 2022-03-23

## 2022-03-23 RX ORDER — KETOROLAC TROMETHAMINE 15 MG/ML
15 INJECTION, SOLUTION INTRAMUSCULAR; INTRAVENOUS ONCE
Status: COMPLETED | OUTPATIENT
Start: 2022-03-23 | End: 2022-03-23

## 2022-03-23 RX ADMIN — KETOROLAC TROMETHAMINE 15 MG: 15 INJECTION, SOLUTION INTRAMUSCULAR; INTRAVENOUS at 20:11

## 2022-03-23 RX ADMIN — IOPAMIDOL 59 ML: 755 INJECTION, SOLUTION INTRAVENOUS at 17:35

## 2022-03-23 NOTE — DISCHARGE INSTRUCTIONS
You had 2 - troponins and 2 EKGs that showed no obvious signs of cardiac infarct or injury.    Your CT of your chest was normal.    We are going to discharge you home and have you follow-up with our outpatient chest pain clinic.

## 2022-03-23 NOTE — TELEPHONE ENCOUNTER
Caller: Eleanor No    Relationship: Self    Best call back number: [phone]    Details provided by patient: PATIENT OUT OF BLOOD PRESSURE MEDICATION- HAS BEEN FOR AWHILE- HAVING SYMPTOMS    PLEASE CALL    Does the patient have less than a 3 day supply:  [x] Yes  [] No    Dasha Pascual Rep   03/23/22 14:23 EDT

## 2022-03-23 NOTE — TELEPHONE ENCOUNTER
Caller: Eleanor No    Relationship to patient: Self    Best call back number: [phone]    Chief complaint: CHEST PAIN, LEFT ARM PAIN, MIDDLE FINGER TO PINKY GOES NUMB, SQUEEZING CHEST PAIN, SHORT OF BREATH    Patient directed to call 911 or go to their nearest emergency room.YES    Patient verbalized understanding: [x] Yes  [] No  If no, why?    Additional notes:  PATIENT STATES SHE WOULD GO THIS EVENING AS SHE DID NOT HAVE ANYONE TO CARE FOR HER KIDS. PATIENT INSTRUCTED IF SYMPTOMS RECUR TO GO ANYWAY AND TAKE KIDS WITH HER OR FIND SOMEONE TO GO WITH HER.

## 2022-03-24 LAB
QT INTERVAL: 362 MS
QT INTERVAL: 396 MS
QTC INTERVAL: 439 MS
QTC INTERVAL: 452 MS

## 2022-03-24 NOTE — ED PROVIDER NOTES
Subjective   Eleanor No is a 36 yr old female that presents emergency department complaints of chest pain.  Patient complains of a squeezing type discomfort around her chest.  She complains of shortness of breath.  She reports nausea but denies vomiting.  Pain has been off and on for the past few days.  She denies any cough, congestion.  Negative for fever, chills.  Patient reports a history of depression and hypertension.  Patient is a smoker.  She denies any history of PE or DVT.  Pain is currently a 4 out of 10.  Patient has had no previous cardiac work-up.      History provided by:  Patient   used: No    Chest Pain  Chest pain location: around chest.  Pain quality comment:  Squeezing  Pain severity:  Moderate  Timing:  Constant  Progression:  Worsening  Relieved by:  Nothing  Worsened by:  Deep breathing  Associated symptoms: shortness of breath    Associated symptoms: no abdominal pain, no cough, no dizziness, no fever, no nausea, no vomiting and no weakness    Risk factors: hypertension and smoking    Risk factors: not male and no prior DVT/PE        Review of Systems   Constitutional: Negative for chills and fever.   Respiratory: Positive for shortness of breath. Negative for cough.    Cardiovascular: Positive for chest pain.   Gastrointestinal: Negative for abdominal pain, nausea and vomiting.   Neurological: Negative for dizziness and weakness.   All other systems reviewed and are negative.      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 Took meds as child.    • Substance abuse ([facility])     5 years in [facility]    • UTI (urinary tract infection)    • Varicella     as a child        No Known Allergies    Past Surgical History:   Procedure Laterality Date   •  CERVICAL BIOPSY  2014    HPV   • CERVICAL BIOPSY     •  SECTION PRIMARY  2015   •  SECTION WITH TUBAL Bilateral 1/10/2019    Procedure:  SECTION REPEAT WITH TUBAL;  Surgeon: Nakia Bartlett MD;  Location: [facility] LABOR DELIVERY;  Service: Obstetrics/Gynecology   • TUBAL ABDOMINAL LIGATION     • WISDOM TOOTH EXTRACTION         Family History   Problem Relation Age of Onset   • Leukemia Mother    • Kidney disease Mother    • Diabetes Father    • Hypertension Father    • Diabetes Brother        Social History     Socioeconomic History   • Marital status: Single   Tobacco Use   • Smoking status: Current Every Day Smoker     Packs/day: 1.00     Years: 15.00     Pack years: 15.00     Types: Cigarettes   • Smokeless tobacco: Never Used   Vaping Use   • Vaping Use: Never used   Substance and Sexual Activity   • Alcohol use: Yes     Comment: SOCIAL   • Drug use: No   • Sexual activity: Yes     Partners: Male     Birth control/protection: Surgical           Objective   Physical Exam  Vitals and nursing note reviewed.   Constitutional:       General: She is not in acute distress.     Appearance: Normal appearance. She is well-developed. She is not ill-appearing or toxic-appearing.   HENT:      Head: Normocephalic and atraumatic.   Eyes:      General: Lids are normal.      Extraocular Movements: Extraocular movements intact.      Conjunctiva/sclera: Conjunctivae normal.      Pupils: Pupils are equal, round, and reactive to light.   Neck:      Trachea: Trachea normal.   Cardiovascular:      Rate and Rhythm: Regular rhythm. Tachycardia present.      Pulses: Normal pulses.      Heart sounds: Normal heart sounds.   Pulmonary:      Effort: Pulmonary effort is normal. No respiratory distress.      Breath sounds: Normal breath sounds. No decreased breath sounds, wheezing, rhonchi or rales.   Abdominal:      General: Bowel sounds are normal.      Palpations: Abdomen is soft.      Tenderness: There is no  abdominal tenderness.   Musculoskeletal:         General: Normal range of motion.      Cervical back: Full passive range of motion without pain and normal range of motion.   Skin:     General: Skin is warm and dry.      Findings: No rash.   Neurological:      Mental Status: She is alert and oriented to person, place, and time.      Cranial Nerves: No cranial nerve deficit.   Psychiatric:         Speech: Speech normal.         Behavior: Behavior normal. Behavior is cooperative.         Procedures           ED Course  ED Course as of 03/23/22 2251   Wed Mar 23, 2022   1953 Results are discussed with patient at this time.  Patient has had 2 normal troponins, 2 unremarkable EKGs with no obvious signs of ischemia, injury or infarct.  Patient CT angio of her chest was normal.  Patient will be discharged at this time.  Patient to follow-up with her outpatient chest pain clinic.  Patient agrees with the treatment plan and verbalized understanding. [KG]      ED Course User Index  [KG] Christelle Butler APRN           Recent Results (from the past 24 hour(s))   Troponin    Collection Time: 03/23/22  4:15 PM    Specimen: Blood   Result Value Ref Range    Troponin T <0.010 0.000 - 0.030 ng/mL   Comprehensive Metabolic Panel    Collection Time: 03/23/22  4:15 PM    Specimen: Blood   Result Value Ref Range    Glucose 94 65 - 99 mg/dL    BUN 11 6 - 20 mg/dL    Creatinine 0.91 0.57 - 1.00 mg/dL    Sodium 142 136 - 145 mmol/L    Potassium 3.7 3.5 - 5.2 mmol/L    Chloride 109 (H) 98 - 107 mmol/L    CO2 21.0 (L) 22.0 - 29.0 mmol/L    Calcium 9.2 8.6 - 10.5 mg/dL    Total Protein 7.2 6.0 - 8.5 g/dL    Albumin 4.40 3.50 - 5.20 g/dL    ALT (SGPT) 15 1 - 33 U/L    AST (SGOT) 18 1 - 32 U/L    Alkaline Phosphatase 98 39 - 117 U/L    Total Bilirubin 0.3 0.0 - 1.2 mg/dL    Globulin 2.8 gm/dL    A/G Ratio 1.6 g/dL    BUN/Creatinine Ratio 12.1 7.0 - 25.0    Anion Gap 12.0 5.0 - 15.0 mmol/L    eGFR 85.1 >60.0 mL/min/1.73   Lipase    Collection  Time: 03/23/22  4:15 PM    Specimen: Blood   Result Value Ref Range    Lipase 35 13 - 60 U/L   BNP    Collection Time: 03/23/22  4:15 PM    Specimen: Blood   Result Value Ref Range    proBNP 239.6 0.0 - 450.0 pg/mL   Green Top (Gel)    Collection Time: 03/23/22  4:15 PM   Result Value Ref Range    Extra Tube Hold for add-ons.    Lavender Top    Collection Time: 03/23/22  4:15 PM   Result Value Ref Range    Extra Tube hold for add-on    Gold Top - SST    Collection Time: 03/23/22  4:15 PM   Result Value Ref Range    Extra Tube Hold for add-ons.    Gray Top    Collection Time: 03/23/22  4:15 PM   Result Value Ref Range    Extra Tube Hold for add-ons.    Light Blue Top    Collection Time: 03/23/22  4:15 PM   Result Value Ref Range    Extra Tube hold for add-on    CBC Auto Differential    Collection Time: 03/23/22  4:15 PM    Specimen: Blood   Result Value Ref Range    WBC 8.96 3.40 - 10.80 10*3/mm3    RBC 3.99 3.77 - 5.28 10*6/mm3    Hemoglobin 11.6 (L) 12.0 - 15.9 g/dL    Hematocrit 36.1 34.0 - 46.6 %    MCV 90.5 79.0 - 97.0 fL    MCH 29.1 26.6 - 33.0 pg    MCHC 32.1 31.5 - 35.7 g/dL    RDW 14.4 12.3 - 15.4 %    RDW-SD 48.0 37.0 - 54.0 fl    MPV 10.1 6.0 - 12.0 fL    Platelets 318 140 - 450 10*3/mm3    Neutrophil % 54.1 42.7 - 76.0 %    Lymphocyte % 33.5 19.6 - 45.3 %    Monocyte % 8.9 5.0 - 12.0 %    Eosinophil % 2.5 0.3 - 6.2 %    Basophil % 0.7 0.0 - 1.5 %    Immature Grans % 0.3 0.0 - 0.5 %    Neutrophils, Absolute 4.85 1.70 - 7.00 10*3/mm3    Lymphocytes, Absolute 3.00 0.70 - 3.10 10*3/mm3    Monocytes, Absolute 0.80 0.10 - 0.90 10*3/mm3    Eosinophils, Absolute 0.22 0.00 - 0.40 10*3/mm3    Basophils, Absolute 0.06 0.00 - 0.20 10*3/mm3    Immature Grans, Absolute 0.03 0.00 - 0.05 10*3/mm3    nRBC 0.0 0.0 - 0.2 /100 WBC   Troponin    Collection Time: 03/23/22  6:14 PM    Specimen: Blood   Result Value Ref Range    Troponin T <0.010 0.000 - 0.030 ng/mL     Note: In addition to lab results from this visit, the labs  "listed above may include labs taken at another facility or during a different encounter within the last 24 hours. Please correlate lab times with ED admission and discharge times for further clarification of the services performed during this visit.    CT Angiogram Chest   Final Result       1.  No evidence of pulmonary embolus or other acute process within the   chest.       This report was finalized on 3/23/2022 6:07 PM by Pavan Jung MD.          XR Chest 1 View   Final Result   No acute cardiopulmonary findings.       This report was finalized on 3/23/2022 5:30 PM by Adrian Rene MD.            Vitals:    03/23/22 1553   BP: 126/96   Pulse: 108   Resp: 14   Temp: 98.2 °F (36.8 °C)   SpO2: 98%   Weight: 75.3 kg (166 lb)   Height: 160 cm (63\")     Medications   iopamidol (ISOVUE-370) 76 % injection 100 mL (59 mL Intravenous Given 3/23/22 1735)   ketorolac (TORADOL) injection 15 mg (15 mg Intravenous Given 3/23/22 2011)     ECG/EMG Results (last 24 hours)     Procedure Component Value Units Date/Time    ECG 12 Lead [[id number]] Collected: 03/23/22 1556     Updated: 03/23/22 1600    ECG 12 Lead [[id number]] Collected: 03/23/22 1810     Updated: 03/23/22 1810        ECG 12 Lead   Preliminary Result         ECG 12 Lead   Preliminary Result                                                 HEART Score (for prediction of 6-week risk of major adverse cardiac event) reviewed and/or performed as part of the patient evaluation and treatment planning process.  The result associated with this review/performance is: 2       MDM    Final diagnoses:   Chest pain, unspecified type       ED Disposition  ED Disposition     ED Disposition   Discharge    Condition   Stable    Comment   --             [facility] CARDIOLOGY  [address]  [address]  [facility] [id number] [phone]        Inder Alvarez, APRN  [address]  [address]  [place] [id number] [phone]             "   Medication List      No changes were made to your prescriptions during this visit.          Christelle Butler, APRN  03/23/22 5940

## 2022-06-08 DIAGNOSIS — K21.9 GASTROESOPHAGEAL REFLUX DISEASE WITHOUT ESOPHAGITIS: ICD-10-CM

## 2022-06-08 DIAGNOSIS — F32.5 DEPRESSION, MAJOR, IN REMISSION: ICD-10-CM

## 2022-06-08 RX ORDER — FAMOTIDINE 20 MG/1
TABLET, FILM COATED ORAL
Qty: 60 TABLET | Refills: 5 | Status: SHIPPED | OUTPATIENT
Start: 2022-06-08

## 2023-03-29 ENCOUNTER — TELEPHONE (OUTPATIENT)
Dept: INTERNAL MEDICINE | Facility: CLINIC | Age: 38
End: 2023-03-29

## 2023-03-29 ENCOUNTER — READMISSION MANAGEMENT (OUTPATIENT)
Dept: CALL CENTER | Facility: HOSPITAL | Age: 38
End: 2023-03-29
Payer: MEDICAID

## 2023-03-29 NOTE — OUTREACH NOTE
Prep Survey    Flowsheet Row Responses   [facility] facility patient discharged from? Non-BH   Is LACE score < 7 ? Non-BH Discharge   Eligibility TCM Hospital [facility]   Date of Discharge 03/30/23   Discharge Disposition Home or Self Care   Discharge diagnosis Alcohol and Opiod usage, depression   Does the patient have one of the following disease processes/diagnoses(primary or secondary)? Other   Prep survey completed? Yes          Ivy STEPHENS - Registered Nurse         Discussed consuming small frequent meals. Pt declines supplements

## 2023-03-29 NOTE — TELEPHONE ENCOUNTER
Caller: [facility]    Relationship to patient: Other    Best call back number: [phone]    New or established patient?  [] New  [x] Established    Date of discharge: 03/30/23    Facility discharged from: [facility]    Diagnosis/Symptoms: ALCOHOL, OPIOIDS , DEPRESSION    Length of stay (If applicable): 3/24/23-3/30/23    Specialty Only: Did you see a [facility] health provider?    [] Yes  [x] No

## 2023-03-31 ENCOUNTER — TRANSITIONAL CARE MANAGEMENT TELEPHONE ENCOUNTER (OUTPATIENT)
Dept: CALL CENTER | Facility: HOSPITAL | Age: 38
End: 2023-03-31
Payer: MEDICAID

## 2023-03-31 NOTE — OUTREACH NOTE
Call Center TCM Note    Flowsheet Row Responses   [facility] patient discharged from? Non-   Does the patient have one of the following disease processes/diagnoses(primary or secondary)? Other   TCM attempt successful? No   Unsuccessful attempts Attempt 1  [No updated verbal release on file from PCP group ]   Call Status Left message          Nata Alvarado RN    3/31/2023, 09:47 EDT

## 2023-04-03 ENCOUNTER — TELEPHONE (OUTPATIENT)
Dept: INTERNAL MEDICINE | Facility: CLINIC | Age: 38
End: 2023-04-03

## 2023-04-03 ENCOUNTER — TRANSITIONAL CARE MANAGEMENT TELEPHONE ENCOUNTER (OUTPATIENT)
Dept: CALL CENTER | Facility: HOSPITAL | Age: 38
End: 2023-04-03
Payer: MEDICAID

## 2023-04-03 NOTE — OUTREACH NOTE
Call Center TCM Note    Flowsheet Row Responses   [facility] patient discharged from? Non-BH   Does the patient have one of the following disease processes/diagnoses(primary or secondary)? Other   TCM attempt successful? No  [ VR]   Unsuccessful attempts Attempt 3          Katina Nevarez RN    4/3/2023, 15:24 EDT       [place] Border Text: The closure involved the vermilion border.

## 2023-04-03 NOTE — TELEPHONE ENCOUNTER
Caller: ARTEMIO    Relationship to patient: Other    Best call back number: [phone]    Patient is needing: ARTEMIO WITH [facility] IN [place] ([address], [place] [address]) CALLED TO LET ASHOK KNOW THAT THE PATIENT IS STILL IN THE HOSPITAL AS OF TODAY 4/3/23 AND THAT IS WHY SHE MISSED HER HOSPITAL FOLLOW UP APPOINTMENT TODAY.    PLEASE ADVISE IF NECESSARY

## 2024-02-19 ENCOUNTER — OFFICE VISIT (OUTPATIENT)
Dept: INTERNAL MEDICINE | Facility: CLINIC | Age: 39
End: 2024-02-19
Payer: MEDICAID

## 2024-02-19 VITALS
WEIGHT: 171.8 LBS | BODY MASS INDEX: 30.43 KG/M2 | SYSTOLIC BLOOD PRESSURE: 140 MMHG | OXYGEN SATURATION: 99 % | TEMPERATURE: 98 F | HEART RATE: 78 BPM | DIASTOLIC BLOOD PRESSURE: 60 MMHG

## 2024-02-19 DIAGNOSIS — F31.12 BIPOLAR AFFECTIVE DISORDER, CURRENTLY MANIC, MODERATE: Primary | ICD-10-CM

## 2024-02-19 DIAGNOSIS — K21.9 GASTROESOPHAGEAL REFLUX DISEASE WITHOUT ESOPHAGITIS: ICD-10-CM

## 2024-02-19 DIAGNOSIS — I10 ESSENTIAL HYPERTENSION: ICD-10-CM

## 2024-02-19 PROCEDURE — 1160F RVW MEDS BY RX/DR IN RCRD: CPT | Performed by: NURSE PRACTITIONER

## 2024-02-19 PROCEDURE — 1159F MED LIST DOCD IN RCRD: CPT | Performed by: NURSE PRACTITIONER

## 2024-02-19 PROCEDURE — 99214 OFFICE O/P EST MOD 30 MIN: CPT | Performed by: NURSE PRACTITIONER

## 2024-02-19 RX ORDER — FAMOTIDINE 20 MG/1
20 TABLET, FILM COATED ORAL 2 TIMES DAILY
Qty: 60 TABLET | Refills: 5 | Status: SHIPPED | OUTPATIENT
Start: 2024-02-19

## 2024-02-19 RX ORDER — ARIPIPRAZOLE 15 MG/1
15 TABLET ORAL DAILY
Qty: 30 TABLET | Refills: 1 | Status: SHIPPED | OUTPATIENT
Start: 2024-02-19

## 2024-02-19 RX ORDER — HYDROCHLOROTHIAZIDE 12.5 MG/1
12.5 TABLET ORAL DAILY
Qty: 30 TABLET | Refills: 5 | Status: SHIPPED | OUTPATIENT
Start: 2024-02-19

## 2024-02-19 NOTE — PROGRESS NOTES
Chief Complaint   Patient presents with    Follow-up    Depression       HPI  Eleanor No is a 38 y.o. female presents for follow-up on depression.  She hasn't been seen since 2021.  She went into rehab again and has been clean since March 2023.  Pt is requesting to restart Abilify.  She states she is currently very manic.  She would like to go back to counseling    The following portions of the patient's history were reviewed and updated as appropriate: allergies, current medications, past family history, past medical history, past social history, past surgical history, and problem list.    Subjective  Review of Systems   Constitutional:  Negative for activity change, appetite change and fatigue.   HENT:  Negative for congestion.    Respiratory:  Negative for cough and shortness of breath.    Cardiovascular:  Negative for chest pain and leg swelling.   Gastrointestinal:  Negative for abdominal pain.   Neurological:  Negative for dizziness, weakness and confusion.   Psychiatric/Behavioral:  Positive for dysphoric mood and depressed mood. Negative for behavioral problems and decreased concentration. The patient is nervous/anxious.        Objective  Visit Vitals  /60 (BP Location: Left arm, Patient Position: Sitting)   Pulse 78   Temp 98 °F (36.7 °C)   Wt 77.9 kg (171 lb 12.8 oz)   SpO2 99%   BMI 30.43 kg/m²        Physical Exam  Vitals and nursing note reviewed.   HENT:      Head: Normocephalic.   Eyes:      Pupils: Pupils are equal, round, and reactive to light.   Cardiovascular:      Rate and Rhythm: Normal rate and regular rhythm.      Pulses: Normal pulses.      Heart sounds: Normal heart sounds. No murmur heard.  Pulmonary:      Effort: Pulmonary effort is normal.      Breath sounds: Normal breath sounds.   Skin:     General: Skin is warm and dry.      Capillary Refill: Capillary refill takes less than 2 seconds.   Neurological:      General: No focal deficit present.      Mental Status: She is alert  and oriented to person, place, and time.      Gait: Gait is intact.   Psychiatric:         Attention and Perception: Attention normal.         Mood and Affect: Mood normal.         Behavior: Behavior normal.          Procedures     Assessment and Plan  Diagnoses and all orders for this visit:    1. Bipolar affective disorder, currently manic, moderate (Primary)  -     ARIPiprazole (Abilify) 15 MG tablet; Take 1 tablet by mouth Daily.  Dispense: 30 tablet; Refill: 1  -     Ambulatory Referral to Behavioral Health    2. Essential hypertension  -     hydroCHLOROthiazide 12.5 MG tablet; Take 1 tablet by mouth Daily.  Dispense: 30 tablet; Refill: 5    3. Gastroesophageal reflux disease without esophagitis  -     famotidine (PEPCID) 20 MG tablet; Take 1 tablet by mouth 2 (Two) Times a Day.  Dispense: 60 tablet; Refill: 5    Restart Abilify  Refer to behavioral health for med mgmt and counseling  Restart HCTZ for HTN  Pepcid for GERD    Return in about 4 weeks (around 3/18/2024) for Recheck Cira.        CARLEE Negrete

## 2024-03-26 ENCOUNTER — OFFICE VISIT (OUTPATIENT)
Dept: INTERNAL MEDICINE | Facility: CLINIC | Age: 39
End: 2024-03-26
Payer: MEDICAID

## 2024-03-26 VITALS
OXYGEN SATURATION: 99 % | HEIGHT: 63 IN | DIASTOLIC BLOOD PRESSURE: 78 MMHG | WEIGHT: 174.5 LBS | TEMPERATURE: 98 F | BODY MASS INDEX: 30.92 KG/M2 | HEART RATE: 78 BPM | SYSTOLIC BLOOD PRESSURE: 120 MMHG

## 2024-03-26 DIAGNOSIS — I10 ESSENTIAL HYPERTENSION: ICD-10-CM

## 2024-03-26 DIAGNOSIS — R20.2 NUMBNESS AND TINGLING SENSATION OF SKIN: ICD-10-CM

## 2024-03-26 DIAGNOSIS — F31.9 BIPOLAR 1 DISORDER: ICD-10-CM

## 2024-03-26 DIAGNOSIS — M25.532 ACUTE PAIN OF LEFT WRIST: Primary | ICD-10-CM

## 2024-03-26 DIAGNOSIS — R20.0 NUMBNESS AND TINGLING SENSATION OF SKIN: ICD-10-CM

## 2024-03-26 PROCEDURE — 3074F SYST BP LT 130 MM HG: CPT | Performed by: NURSE PRACTITIONER

## 2024-03-26 PROCEDURE — 1159F MED LIST DOCD IN RCRD: CPT | Performed by: NURSE PRACTITIONER

## 2024-03-26 PROCEDURE — 3078F DIAST BP <80 MM HG: CPT | Performed by: NURSE PRACTITIONER

## 2024-03-26 PROCEDURE — 1160F RVW MEDS BY RX/DR IN RCRD: CPT | Performed by: NURSE PRACTITIONER

## 2024-03-26 PROCEDURE — 99214 OFFICE O/P EST MOD 30 MIN: CPT | Performed by: NURSE PRACTITIONER

## 2024-03-26 RX ORDER — MELOXICAM 15 MG/1
TABLET ORAL
Qty: 30 TABLET | Refills: 5 | Status: SHIPPED | OUTPATIENT
Start: 2024-03-26

## 2024-03-26 RX ORDER — ARIPIPRAZOLE 15 MG/1
15 TABLET ORAL DAILY
Qty: 30 TABLET | Refills: 5 | Status: SHIPPED | OUTPATIENT
Start: 2024-03-26

## 2024-03-26 NOTE — PROGRESS NOTES
"Chief Complaint   Patient presents with    Wrist Pain     Pain in Left wrist.      Follow-up     Bipolar       HPI  Eleanor No is a 38 y.o. female presents for follow-up on bipolar, acid reflux, and HTN.  She was restarted on Abilify last visit which is working well for her. Bipolar.  She feels the shakeel has improved.  She wants to keep the dose at 15mg.  Her acid reflux is now controlled on the Pepcid.  Tolerating the HCTZ well for her HTN.  Pt also complains of left wrist pain for about a week.  She states she is a  and it's affecting her work.  Her last 3 fingers feel numb.  Her friend gave her an Ibuprofen which helped \"some\".    The following portions of the patient's history were reviewed and updated as appropriate: allergies, current medications, past family history, past medical history, past social history, past surgical history, and problem list.    Subjective  Review of Systems   Constitutional:  Negative for activity change, appetite change and fatigue.   HENT:  Negative for congestion.    Respiratory:  Negative for cough and shortness of breath.    Cardiovascular:  Negative for chest pain and leg swelling.   Gastrointestinal:  Negative for abdominal pain.   Musculoskeletal:  Positive for arthralgias (left wrist).   Neurological:  Positive for numbness. Negative for dizziness, weakness and confusion.   Psychiatric/Behavioral:  Negative for agitation, behavioral problems, decreased concentration and depressed mood. The patient is not nervous/anxious.        Objective  Visit Vitals  /78 (BP Location: Left arm, Patient Position: Sitting)   Pulse 78   Temp 98 °F (36.7 °C)   Ht 160 cm (63\")   Wt 79.2 kg (174 lb 8 oz)   SpO2 99%   BMI 30.91 kg/m²        Physical Exam  Vitals and nursing note reviewed.   HENT:      Head: Normocephalic.      Right Ear: Tympanic membrane normal.      Left Ear: Tympanic membrane normal.      Mouth/Throat:      Mouth: Mucous membranes are moist.      Pharynx: " Oropharynx is clear.   Eyes:      Pupils: Pupils are equal, round, and reactive to light.   Pulmonary:      Effort: Pulmonary effort is normal. No respiratory distress.      Breath sounds: No wheezing.   Musculoskeletal:      Comments: Pain with flexion of left wrist, last 3 digits of left hand feel numb   Skin:     General: Skin is warm and dry.      Capillary Refill: Capillary refill takes less than 2 seconds.   Neurological:      General: No focal deficit present.      Mental Status: She is alert and oriented to person, place, and time.      Gait: Gait is intact.   Psychiatric:         Attention and Perception: Attention normal.         Mood and Affect: Mood normal.         Behavior: Behavior normal.          Procedures     Assessment and Plan  Diagnoses and all orders for this visit:    1. Acute pain of left wrist (Primary)  -     EMG & Nerve Conduction Test; Future  -     meloxicam (MOBIC) 15 MG tablet; Take 1/2 to 1 tablet by mouth once daily as needed for moderate pain. Take with food  Dispense: 30 tablet; Refill: 5    2. Numbness and tingling sensation of skin  -     EMG & Nerve Conduction Test; Future  -     meloxicam (MOBIC) 15 MG tablet; Take 1/2 to 1 tablet by mouth once daily as needed for moderate pain. Take with food  Dispense: 30 tablet; Refill: 5    3. Bipolar 1 disorder  -     ARIPiprazole (Abilify) 15 MG tablet; Take 1 tablet by mouth Daily.  Dispense: 30 tablet; Refill: 5    4. Essential hypertension    Schedule nerve study, likely carpel tunnel  Start Meloxicam  Recommend OTC wrist brace at night  Abilify working great for Bipolar, will refill and cont at 15mg  HTN stable on HCTZ      Return in about 3 months (around 6/26/2024) for Annual.        CARLEE Negrete

## 2024-10-21 DIAGNOSIS — K21.9 GASTROESOPHAGEAL REFLUX DISEASE WITHOUT ESOPHAGITIS: ICD-10-CM

## 2024-10-21 DIAGNOSIS — I10 ESSENTIAL HYPERTENSION: ICD-10-CM

## 2024-10-21 RX ORDER — HYDROCHLOROTHIAZIDE 12.5 MG/1
12.5 TABLET ORAL DAILY
Qty: 30 TABLET | Refills: 5 | Status: SHIPPED | OUTPATIENT
Start: 2024-10-21

## 2024-10-21 RX ORDER — FAMOTIDINE 20 MG/1
20 TABLET, FILM COATED ORAL 2 TIMES DAILY
Qty: 60 TABLET | Refills: 5 | Status: SHIPPED | OUTPATIENT
Start: 2024-10-21

## (undated) DEVICE — TRY SPINE BLCK WHITACRE 25G 3X5IN

## (undated) DEVICE — SOL IRR NACL 0.9PCT BT 1000ML

## (undated) DEVICE — SUT MNCRYL 3/0 27L Y523H BX/36

## (undated) DEVICE — SUT VIC 2/0 CT1 27IN J259H

## (undated) DEVICE — SUT GUT PLN 0 STD TIE 54IN S104H

## (undated) DEVICE — PK C/SECT 10

## (undated) DEVICE — COATED VICRYL  (POLYGLACTIN 910) SUTURE, VIOLET BRAIDED, STERILE, SYNTHETIC ABSORBABLE SUTURE: Brand: COATED VICRYL

## (undated) DEVICE — 39" SINGLE PATIENT USE HOVERMATT: Brand: SINGLE PATIENT USE HOVERMATT

## (undated) DEVICE — SOL IRR H2O BTL 1000ML STRL

## (undated) DEVICE — SUT PLAIN  3/0 CT1 27IN 842H

## (undated) DEVICE — GLV SURG SENSICARE W/ALOE PF LF 6.5 STRL